# Patient Record
Sex: FEMALE | Race: BLACK OR AFRICAN AMERICAN | NOT HISPANIC OR LATINO
[De-identification: names, ages, dates, MRNs, and addresses within clinical notes are randomized per-mention and may not be internally consistent; named-entity substitution may affect disease eponyms.]

---

## 2019-04-21 ENCOUNTER — TRANSCRIPTION ENCOUNTER (OUTPATIENT)
Age: 34
End: 2019-04-21

## 2020-08-13 ENCOUNTER — APPOINTMENT (OUTPATIENT)
Dept: OBGYN | Facility: CLINIC | Age: 35
End: 2020-08-13
Payer: COMMERCIAL

## 2020-08-13 PROCEDURE — 99385 PREV VISIT NEW AGE 18-39: CPT

## 2020-08-13 PROCEDURE — 36415 COLL VENOUS BLD VENIPUNCTURE: CPT

## 2020-08-15 ENCOUNTER — TRANSCRIPTION ENCOUNTER (OUTPATIENT)
Age: 35
End: 2020-08-15

## 2020-11-10 PROBLEM — Z00.00 ENCOUNTER FOR PREVENTIVE HEALTH EXAMINATION: Status: ACTIVE | Noted: 2020-11-10

## 2020-11-12 ENCOUNTER — APPOINTMENT (OUTPATIENT)
Dept: SURGERY | Facility: CLINIC | Age: 35
End: 2020-11-12
Payer: COMMERCIAL

## 2020-11-12 VITALS
OXYGEN SATURATION: 100 % | HEIGHT: 65 IN | DIASTOLIC BLOOD PRESSURE: 87 MMHG | SYSTOLIC BLOOD PRESSURE: 143 MMHG | HEART RATE: 89 BPM | BODY MASS INDEX: 25.83 KG/M2 | WEIGHT: 155 LBS

## 2020-11-12 DIAGNOSIS — K43.9 VENTRAL HERNIA W/OUT OBSTRUCTION OR GANGRENE: ICD-10-CM

## 2020-11-12 DIAGNOSIS — Z82.49 FAMILY HISTORY OF ISCHEMIC HEART DISEASE AND OTHER DISEASES OF THE CIRCULATORY SYSTEM: ICD-10-CM

## 2020-11-12 DIAGNOSIS — Z78.9 OTHER SPECIFIED HEALTH STATUS: ICD-10-CM

## 2020-11-12 DIAGNOSIS — Z87.891 PERSONAL HISTORY OF NICOTINE DEPENDENCE: ICD-10-CM

## 2020-11-12 DIAGNOSIS — R19.00 INTRA-ABDOMINAL AND PELVIC SWELLING, MASS AND LUMP, UNSPECIFIED SITE: ICD-10-CM

## 2020-11-12 PROCEDURE — 99072 ADDL SUPL MATRL&STAF TM PHE: CPT

## 2020-11-12 PROCEDURE — 99203 OFFICE O/P NEW LOW 30 MIN: CPT

## 2020-11-12 RX ORDER — SACCHAROMYCES BOULARDII 250 MG
250 CAPSULE ORAL
Refills: 0 | Status: ACTIVE | COMMUNITY

## 2020-11-12 RX ORDER — MULTIVITAMIN
TABLET ORAL
Refills: 0 | Status: ACTIVE | COMMUNITY

## 2020-11-12 NOTE — CONSULT LETTER
[Dear  ___] : Dear  [unfilled], [Consult Letter:] : I had the pleasure of evaluating your patient, [unfilled]. [Consult Closing:] : Thank you very much for allowing me to participate in the care of this patient.  If you have any questions, please do not hesitate to contact me. [Sincerely,] : Sincerely, [FreeTextEntry3] : Barron Nicolas MD\par

## 2020-11-12 NOTE — HISTORY OF PRESENT ILLNESS
[de-identified] : NORM OCHOA is a 35 year old F who is referred to the office for consultation visit, she presents w the cc of having a bulge to the abdominal wall. She denies any pain or discomfort but wants to know if the bulge she feels is a hernia.

## 2020-11-12 NOTE — REVIEW OF SYSTEMS
[As Noted in HPI] : as noted in HPI [Negative] : Heme/Lymph [FreeTextEntry7] : feels a bulge to the abdomen; no pain or discomfort

## 2020-11-12 NOTE — PHYSICAL EXAM
[Normal Breath Sounds] : Normal breath sounds [Normal Rate and Rhythm] : normal rate and rhythm [No Rash or Lesion] : No rash or lesion [Alert] : alert [Oriented to Person] : oriented to person [Oriented to Place] : oriented to place [Oriented to Time] : oriented to time [Calm] : calm [JVD] : no jugular venous distention  [de-identified] : A/Ox3; NAD. appears comfortable [de-identified] : EOMI; sclera anicteric. Nasal mucosa pink, septum midline. Oral mucosa pink. Tongue midline, Pharynx without exudates. [de-identified] : diffuse bulge, left upper abdomen; \par abd is soft, NT/ND\par unable to appreciate defect /weakness  [de-identified] : +ROM, no joint swelling

## 2021-01-07 ENCOUNTER — TRANSCRIPTION ENCOUNTER (OUTPATIENT)
Age: 36
End: 2021-01-07

## 2021-01-18 ENCOUNTER — TRANSCRIPTION ENCOUNTER (OUTPATIENT)
Age: 36
End: 2021-01-18

## 2021-01-28 ENCOUNTER — APPOINTMENT (OUTPATIENT)
Dept: SURGERY | Facility: CLINIC | Age: 36
End: 2021-01-28
Payer: COMMERCIAL

## 2021-01-28 VITALS
WEIGHT: 165 LBS | HEIGHT: 65 IN | BODY MASS INDEX: 27.49 KG/M2 | HEART RATE: 78 BPM | DIASTOLIC BLOOD PRESSURE: 76 MMHG | SYSTOLIC BLOOD PRESSURE: 115 MMHG

## 2021-01-28 PROCEDURE — 99072 ADDL SUPL MATRL&STAF TM PHE: CPT

## 2021-01-28 PROCEDURE — 99213 OFFICE O/P EST LOW 20 MIN: CPT

## 2021-01-28 NOTE — PHYSICAL EXAM
[Normal Breath Sounds] : Normal breath sounds [Normal Rate and Rhythm] : normal rate and rhythm [No Rash or Lesion] : No rash or lesion [Alert] : alert [Oriented to Person] : oriented to person [Oriented to Place] : oriented to place [Oriented to Time] : oriented to time [Calm] : calm [JVD] : no jugular venous distention  [de-identified] : A/Ox3; NAD. appears comfortable [de-identified] : EOMI; sclera anicteric. Nasal mucosa pink, septum midline. Oral mucosa pink. Tongue midline, Pharynx without exudates. [de-identified] : small defect noted to umbilicus , no discomfort or tenderness \par abd is soft, NT/ND\par unable to appreciate defect /weakness  [de-identified] : +ROM, no joint swelling

## 2021-01-28 NOTE — HISTORY OF PRESENT ILLNESS
[de-identified] : NORM OCHOA is a 35 year old F here for follow up visit, c/o bulge to left side of abdominal wall. No pain. Patient had an Abd US 01/11/21, which had showed umbilical hernia containing fat and bowel. No abnormality seen to the left side of abdominal wall.

## 2021-01-28 NOTE — ASSESSMENT
[FreeTextEntry1] : Patient is a 35 y.o F who presents with bulge to left side of abdominal wall. Abd US from 01/11/21 reviewed. Small defect to umbilicus, which patient does not have any pain or discomfort.

## 2021-01-28 NOTE — PLAN
[FreeTextEntry1] : findings were discussed w the patient . Her concern was in the left upper quadrant where there is no finding\par She has a small umbilical hernia where she has absolutely no symptoms and is unrelated with her complaint in the LLQ abdomen\par would not recommend surgical intervention at this time\par monitor/observe for any changes or worsening of symptoms \par \par Warning signs, follow up, and restrictions were discussed with the patient.\par Patient's questions and concerns addressed to their satisfaction, and patient verbalized an understanding of the information discussed.\par

## 2021-01-28 NOTE — REVIEW OF SYSTEMS
[As Noted in HPI] : as noted in HPI [Negative] : Heme/Lymph [FreeTextEntry7] : c/o bulge, left side of abdominal wall

## 2021-09-19 ENCOUNTER — TRANSCRIPTION ENCOUNTER (OUTPATIENT)
Age: 36
End: 2021-09-19

## 2021-10-14 ENCOUNTER — EMERGENCY (EMERGENCY)
Facility: HOSPITAL | Age: 36
LOS: 0 days | Discharge: HOME | End: 2021-10-14
Attending: EMERGENCY MEDICINE
Payer: COMMERCIAL

## 2021-10-14 VITALS
RESPIRATION RATE: 17 BRPM | HEART RATE: 80 BPM | SYSTOLIC BLOOD PRESSURE: 130 MMHG | HEIGHT: 65 IN | OXYGEN SATURATION: 100 % | DIASTOLIC BLOOD PRESSURE: 90 MMHG | WEIGHT: 169.98 LBS | TEMPERATURE: 98 F

## 2021-10-14 DIAGNOSIS — R07.0 PAIN IN THROAT: ICD-10-CM

## 2021-10-14 DIAGNOSIS — J02.9 ACUTE PHARYNGITIS, UNSPECIFIED: ICD-10-CM

## 2021-10-14 PROCEDURE — 99283 EMERGENCY DEPT VISIT LOW MDM: CPT

## 2021-10-14 NOTE — ED ADULT TRIAGE NOTE - CCCP TRG CHIEF CMPLNT
Patient arrived as a direct admit to the floor. Patient transferred to bed from [facility].Patient resting in bed, call light and personal belongings within reach, bed in the low and locked position with upper side rails up. Assessment complete, vital signs stable, all needs met at this time. Hourly rounding in place, plan of care discussed with patient, patient verbalized understanding.    sore throat

## 2021-10-14 NOTE — ED PROVIDER NOTE - THROAT FINDINGS
R. tonsil looks very slightly larger, small white exudate. No uvular deviation, no tender lymphadenopathy, no voice change.

## 2021-10-14 NOTE — ED ADULT NURSE NOTE - ED STAT RN HANDOFF DETAILS
report taken from audi deng. pt c/o sore throat, denies: SOB, difficulty breathing, n/v, abd pain, fevers, cough. pt endorses she started her antibiotics today. pending MD [place].

## 2021-10-14 NOTE — ED PROVIDER NOTE - OBJECTIVE STATEMENT
Pt is a 35 yo lady with no significant past medical history who presents to the ED with sore throat. Has had 1 month of symptoms, and has recently seen ENT on Monday, who performed endoscopy, and said it was likely laryngitis, related to reflux, and started her on prilosec. She called him with peristent symptoms yesterday, and he started her on cefuroxime. No fevers, no cough, no abdominal pain, is vaccinated against covid, and has had negative test recently.

## 2021-10-14 NOTE — ED PROVIDER NOTE - PATIENT PORTAL LINK FT
You can access the FollowMyHealth Patient Portal offered by [facility] by registering at the following website: [email or web address]. By joining [facility]’s FollowMyHealth portal, you will also be able to view your health information using other applications (apps) compatible with our system.

## 2021-10-14 NOTE — ED PROVIDER NOTE - CLINICAL SUMMARY MEDICAL DECISION MAKING FREE TEXT BOX
Ddx: Pharyngitis/ unlikely peritonsillar abscess over 1 month, as well as recent ENT visit, may be starting to develop one, just started on antibiotics  Plan: Reassurance, and pt has f/u w her ENT in the next week.

## 2021-10-14 NOTE — ED ADULT NURSE NOTE - OBJECTIVE STATEMENT
36 year old female c/o right throat pain 10/10 for 1 mon now. Patient states pain is worse especially when eating and drinking. Patient was seen by ENT on Monday and was put on ABT (cefuroxime). Patient states B/L LE became painful and restless starting today.

## 2022-01-20 ENCOUNTER — APPOINTMENT (OUTPATIENT)
Dept: OBGYN | Facility: CLINIC | Age: 37
End: 2022-01-20
Payer: COMMERCIAL

## 2022-01-20 ENCOUNTER — NON-APPOINTMENT (OUTPATIENT)
Age: 37
End: 2022-01-20

## 2022-01-20 VITALS
WEIGHT: 183 LBS | DIASTOLIC BLOOD PRESSURE: 82 MMHG | HEIGHT: 65 IN | BODY MASS INDEX: 30.49 KG/M2 | SYSTOLIC BLOOD PRESSURE: 124 MMHG

## 2022-01-20 DIAGNOSIS — Z80.3 FAMILY HISTORY OF MALIGNANT NEOPLASM OF BREAST: ICD-10-CM

## 2022-01-20 DIAGNOSIS — Z31.9 ENCOUNTER FOR PROCREATIVE MANAGEMENT, UNSPECIFIED: ICD-10-CM

## 2022-01-20 DIAGNOSIS — Z83.49 FAMILY HISTORY OF OTHER ENDOCRINE, NUTRITIONAL AND METABOLIC DISEASES: ICD-10-CM

## 2022-01-20 PROCEDURE — 99395 PREV VISIT EST AGE 18-39: CPT

## 2022-01-20 PROCEDURE — 36415 COLL VENOUS BLD VENIPUNCTURE: CPT

## 2022-01-20 PROCEDURE — 99213 OFFICE O/P EST LOW 20 MIN: CPT | Mod: 25

## 2022-01-21 LAB
C TRACH RRNA SPEC QL NAA+PROBE: NOT DETECTED
DHEA-S SERPL-MCNC: 124 UG/DL
ESTRADIOL SERPL-MCNC: 170 PG/ML
FSH SERPL-MCNC: 3.4 IU/L
HPV HIGH+LOW RISK DNA PNL CVX: NOT DETECTED
N GONORRHOEA RRNA SPEC QL NAA+PROBE: NOT DETECTED
PROGEST SERPL-MCNC: 0.2 NG/ML
SOURCE AMPLIFICATION: NORMAL
TESTOST SERPL-MCNC: 7.9 NG/DL

## 2022-01-26 PROBLEM — Z83.49 FAMILY HISTORY OF THYROID DISEASE: Status: ACTIVE | Noted: 2022-01-26

## 2022-01-26 PROBLEM — Z31.9 PATIENT DESIRES PREGNANCY: Status: ACTIVE | Noted: 2022-01-26

## 2022-01-26 PROBLEM — Z80.3 FAMILY HISTORY OF MALIGNANT NEOPLASM OF BREAST: Status: ACTIVE | Noted: 2022-01-26

## 2022-01-26 LAB
ANTI-MUELLERIAN HORMONE: 5.45 NG/ML
CYTOLOGY CVX/VAG DOC THIN PREP: NORMAL

## 2022-01-26 NOTE — HISTORY OF PRESENT ILLNESS
[TextBox_19] : Breast MRI scheduled for Feb 1st [PGxTotal] : 1 [PGHxFullTerm] : 0 [PGHxPremature] : 0 [PGHxAbortions] : 0 [[facility] Services[facility]Living] : 0 [PGHxABInduced] : 0 [PGHxABSpont] : 0 [PGHxEctopic] : 0 [PGHxMultBirths] : 0 [FreeTextEntry1] : 2003: TOP with DVC

## 2022-01-26 NOTE — DISCUSSION/SUMMARY
[FreeTextEntry1] : 37 y/o P0 presenting for annual exam\par -f/u pap and GC/CT\par -Irregular menses, fertility eval: f/u hormonal panel\par -Breast MRI scheduled for 2/1 - requisitions given by PCP\par -Contraception: declines, desires pregnancy\par -f/u once labs result

## 2022-01-28 ENCOUNTER — APPOINTMENT (OUTPATIENT)
Dept: OBGYN | Facility: CLINIC | Age: 37
End: 2022-01-28

## 2022-01-28 ENCOUNTER — APPOINTMENT (OUTPATIENT)
Dept: OBGYN | Facility: CLINIC | Age: 37
End: 2022-01-28
Payer: COMMERCIAL

## 2022-01-28 DIAGNOSIS — N92.6 IRREGULAR MENSTRUATION, UNSPECIFIED: ICD-10-CM

## 2022-01-28 LAB
PROLACTIN SERPL-MCNC: 32.5 NG/ML
TSH SERPL-ACNC: 5.06 UIU/ML

## 2022-01-28 PROCEDURE — 99213 OFFICE O/P EST LOW 20 MIN: CPT | Mod: 95

## 2022-01-29 PROBLEM — N92.6 IRREGULAR MENSES: Status: ACTIVE | Noted: 2022-01-26

## 2022-01-29 NOTE — HISTORY OF PRESENT ILLNESS
[FreeTextEntry1] : 35 y/o F with irregular menses x3 months presenting for follow up. Fertility and AUB pan reviewed\par \par AMH: wnl \par TSH: elevated 5\par Prolactin with mild elevation 32

## 2022-01-29 NOTE — DISCUSSION/SUMMARY
[FreeTextEntry1] : 37 y/o F with irregular menses presenting for follow up\par -labs reviewed with patient as above\par -Fertility workup wnl\par -AUB panel significant for elevated TSH (FT4 not drawn), Prolactin very mildly elevated - referral given for Dr. Arnold endocrinologist for further evaluation and management - will monitor regulation of menses after intervention - if no endocrine intervention advised, consider ovasitol\par -f/u when she is actively trying to conceive if no pregnancy after 6 months

## 2022-04-04 NOTE — DATA REVIEWED
[FreeTextEntry1] : Date of Exam: 02-\par  \par EXAM: MRI BILATERAL BREASTS WITHOUT AND WITH CONTRAST\par \par HISTORY: The patient is 36 years old and is seen for high risk screening. Patient with no acute breast complaints at this time. Family history of breast cancer: Maternal aunt\par \par TECHNIQUE: Breast MR protocol - Multiplanar, multisequence MR imaging of both breasts was performed on a 3T magnet: T2 weighted sequence with fat suppression in the axial plane, T1 weighted sequences with fat suppression before and after the administration of gadolinium contrast in the axial plane, and sagittal reformats. The patient was scanned in the prone position utilizing a dedicated breast coil. Post processing subtraction was performed. This study was analyzed on a [employer] Workstation with capabilities of multiplanar reformatting, maximum intensity projection, computer aided detection and time:signal intensity kinetic curve generation.  Results were compared with the PACS workstation. \par Contrast: 16 mL gadoterate meglumine from a 20 mL vial.\par \par COMPARISON: The present examination has been compared to prior breast mammogram 8/19/2020 and breast ultrasound 11/13/2020 and 6/10/2021\par \par FINDINGS:  \par BACKGROUND BREAST APPEARANCE: There is extremely dense fibroglandular tissue with mild background parenchymal enhancement.\par The nipples and skin appear unremarkable bilaterally. \par \par Left breast:\par The superficial 1.3 x 0.9 cm heterogeneously enhancing well marginated mass in the anterior lower outer quadrant at 5:00 corresponds with the sonographic finding from 6/10/2021, and resembles a probably benign fibroadenoma. The 0.7 x 0.6 x 0.4 cm homogeneously enhancing well marginated mass with persistent type vascular enhancement kinetics and corresponding hyperintense STIR signal in the upper inner quadrant at 11:00 posterior depth (8 cm from the nipple) resembles a probably benign fibroadenoma or lymph node. Additional scattered nonspecific enhancing foci are seen, which are probably benign. No other suspicious masses or areas of normal enhancement are seen in the remainder left breast\par \par Right breast:\par The 0.4 x 0.4 x 0.3 cm well marginated homogeneously enhancing mass with persistent type vascular enhancement kinetics and a corresponding hyperintense STIR signal in the upper outer quadrant at 11:00 posterior depth (8 cm from the nipple), resembles a probably benign lymph node or small fibroadenoma. The 0.9 x 0.7 cm oval shaped heterogeneously enhancing mass with dark internal linear areas in the lower outer quadrant at 7:00 middle depth resembles a probably benign fibroadenoma. Additional scattered nonspecific enhancing foci are noted, are probably benign. No other suspicious masses or areas of normal enhancement are seen in the remainder right breast. \par \par There is no significant axillary or internal mammary lymphadenopathy.\par \par IMPRESSION: \par Left breast:\par -The superficial 1.3 x 0.9 cm well marginated mass at 5:00 corresponds with prior sonogram exam from 6/10/2021, and resembles a probably benign fibroadenoma. \par -The 0.7 x 0.6 x 0.4 cm homogeneously enhancing mass with at 11:00 posterior depth resembles a probably benign fibroadenoma or lymph node. \par \par Right breast:\par -The 0.4 x 0.4 x 0.3 cm well marginated homogeneously enhancing mass at 11:00 posterior depth, resembles a probably benign lymph node or small fibroadenoma\par -The 0.9 x 0.7 cm heterogeneously enhancing mass at 7:00 middle depth resembles a probably benign fibroadenoma. \par \par \par FOLLOW-UP: Follow-up imaging in 6 months.\par -COMPLETE BILATERAL BREAST ULTRASOUND EXAM SHOULD BE PERFORMED AT THIS TIME.\par -6 MONTH FOLLOW-UP BILATERAL BREAST MRI EXAMINATION IS RECOMMENDED. \par \par ASSESSMENT: BI-RADS Category 3:  Probably benign.

## 2022-04-07 ENCOUNTER — APPOINTMENT (OUTPATIENT)
Dept: SURGERY | Facility: CLINIC | Age: 37
End: 2022-04-07

## 2022-04-07 DIAGNOSIS — N64.4 MASTODYNIA: ICD-10-CM

## 2022-04-21 ENCOUNTER — APPOINTMENT (OUTPATIENT)
Dept: SURGERY | Facility: CLINIC | Age: 37
End: 2022-04-21
Payer: COMMERCIAL

## 2022-04-21 VITALS — TEMPERATURE: 92.8 F

## 2022-04-21 VITALS
BODY MASS INDEX: 31.49 KG/M2 | SYSTOLIC BLOOD PRESSURE: 120 MMHG | HEIGHT: 65 IN | DIASTOLIC BLOOD PRESSURE: 83 MMHG | WEIGHT: 189 LBS | HEART RATE: 73 BPM

## 2022-04-21 PROBLEM — N64.4 BREAST PAIN IN FEMALE: Status: ACTIVE | Noted: 2022-04-21

## 2022-04-21 PROCEDURE — 99213 OFFICE O/P EST LOW 20 MIN: CPT

## 2022-04-21 NOTE — PHYSICAL EXAM
[Normal Breath Sounds] : Normal breath sounds [Normal Rate and Rhythm] : normal rate and rhythm [No Rash or Lesion] : No rash or lesion [Alert] : alert [Oriented to Person] : oriented to person [Oriented to Place] : oriented to place [Oriented to Time] : oriented to time [Calm] : calm

## 2022-04-21 NOTE — ASSESSMENT
[FreeTextEntry1] : Ms. OCHOA  is presenting today for an evaluation. Patient has some tenderness to the left breast, which comes and goes. \par Results of her recent imaging and physical examination findings were discussed in detail. She was informed of significance of findings. \par

## 2022-04-21 NOTE — HISTORY OF PRESENT ILLNESS
[de-identified] : Ms. NORM OCHOA is a 36 year y.o F who presents to the office w the cc of having left breast pain. The pain comes and comes, which she describes as a stinging/burning sensation. Ms OCHOA also admits to experiencing neck pains. Patient states she had seen her PCP, Dr. Mack, and had a breast US done, and also recommended for breast MRI. MRI was done 02/17/22. \par She denies feeling any breast masses or lumps to either side, no nipple discharge. No personal history of Breast CA, family history of Breast CA includes, aunt (maternal).

## 2022-04-21 NOTE — DATA REVIEWED
[FreeTextEntry1] : Date of Exam: 02-\par  \par EXAM: MRI BILATERAL BREASTS WITHOUT AND WITH CONTRAST\par \par HISTORY: The patient is 36 years old and is seen for high risk screening. Patient with no acute breast complaints at this time. Family history of breast cancer: Maternal aunt\par \par TECHNIQUE: Breast MR protocol - Multiplanar, multisequence MR imaging of both breasts was performed on a 3T magnet: T2 weighted sequence with fat suppression in the axial plane, T1 weighted sequences with fat suppression before and after the administration of gadolinium contrast in the axial plane, and sagittal reformats. The patient was scanned in the prone position utilizing a dedicated breast coil. Post processing subtraction was performed. This study was analyzed on a [email or web address] Workstation with capabilities of multiplanar reformatting, maximum intensity projection, computer aided detection and time:signal intensity kinetic curve generation.  Results were compared with the PACS workstation. \par Contrast: 16 mL gadoterate meglumine from a 20 mL vial.\par \par COMPARISON: The present examination has been compared to prior breast mammogram 8/19/2020 and breast ultrasound 11/13/2020 and 6/10/2021\par \par FINDINGS:  \par BACKGROUND BREAST APPEARANCE: There is extremely dense fibroglandular tissue with mild background parenchymal enhancement.\par The nipples and skin appear unremarkable bilaterally. \par \par Left breast:\par The superficial 1.3 x 0.9 cm heterogeneously enhancing well marginated mass in the anterior lower outer quadrant at 5:00 corresponds with the sonographic finding from 6/10/2021, and resembles a probably benign fibroadenoma. The 0.7 x 0.6 x 0.4 cm homogeneously enhancing well marginated mass with persistent type vascular enhancement kinetics and corresponding hyperintense STIR signal in the upper inner quadrant at 11:00 posterior depth (8 cm from the nipple) resembles a probably benign fibroadenoma or lymph node. Additional scattered nonspecific enhancing foci are seen, which are probably benign. No other suspicious masses or areas of normal enhancement are seen in the remainder left breast\par \par Right breast:\par The 0.4 x 0.4 x 0.3 cm well marginated homogeneously enhancing mass with persistent type vascular enhancement kinetics and a corresponding hyperintense STIR signal in the upper outer quadrant at 11:00 posterior depth (8 cm from the nipple), resembles a probably benign lymph node or small fibroadenoma. The 0.9 x 0.7 cm oval shaped heterogeneously enhancing mass with dark internal linear areas in the lower outer quadrant at 7:00 middle depth resembles a probably benign fibroadenoma. Additional scattered nonspecific enhancing foci are noted, are probably benign. No other suspicious masses or areas of normal enhancement are seen in the remainder right breast. \par \par There is no significant axillary or internal mammary lymphadenopathy.\par \par IMPRESSION: \par Left breast:\par -The superficial 1.3 x 0.9 cm well marginated mass at 5:00 corresponds with prior sonogram exam from 6/10/2021, and resembles a probably benign fibroadenoma. \par -The 0.7 x 0.6 x 0.4 cm homogeneously enhancing mass with at 11:00 posterior depth resembles a probably benign fibroadenoma or lymph node. \par \par Right breast:\par -The 0.4 x 0.4 x 0.3 cm well marginated homogeneously enhancing mass at 11:00 posterior depth, resembles a probably benign lymph node or small fibroadenoma\par -The 0.9 x 0.7 cm heterogeneously enhancing mass at 7:00 middle depth resembles a probably benign fibroadenoma. \par \par \par FOLLOW-UP: Follow-up imaging in 6 months.\par -COMPLETE BILATERAL BREAST ULTRASOUND EXAM SHOULD BE PERFORMED AT THIS TIME.\par -6 MONTH FOLLOW-UP BILATERAL BREAST MRI EXAMINATION IS RECOMMENDED. \par \par ASSESSMENT: BI-RADS Category 3:  Probably benign. \par \par \par \par \par \par 	\par Patient: NORM OCHOA\par YOB: 1985\par Phone: [phone]\par MRN: [id number] Acc: [id number]\par Date of Exam: 11-\par  \par BREAST ULTRASOUND BILATERAL COMPLETE\par \par CLINICAL HISTORY: Screening \par 35 years old patient presents for bilateral breast ultrasound exam. \par Z12.39 Screening Ultrasound\par Z80.3 Family history of Breast Cancer \par \par \par COMPARISON: None\par \par RIGHT BREAST: \par Sonographic evaluation of the right breast was performed, including each of the four\par quadrants and the retroareolar region, in clockwise fashion. \par \par 4:00, 4 cm from the nipple, 7 x 3 mm hypoechoic nodule\par 5:00, 3 cm from the nipple, 7 x 3 mm hypoechoic nodule\par 6:00, 2 cm from the nipple, 9 x 3 mm hypoechoic nodule\par 8:00, 6 cm from the nipple, 15 x 5 mm hypoechoic nodule\par 9:00, 7 cm from the nipple, 9 x 3 mm hypoechoic nodule\par Retroareolar, 10 x 7 mm hypoechoic nodule\par \par LEFT BREAST: \par Sonographic evaluation of the left breast was performed, including each of the four\par quadrants and the retroareolar region, in clockwise fashion. \par \par 12:00, 2 cm from the nipple, 9 x 4 mm hypoechoic nodule\par 2:00, 6 cm from the nipple, 8 x 4 mm hypoechoic nodule\par 3:00, 2 cm from the nipple, 8 x 3 mm hypoechoic nodule\par 4:00, 3 cm from the nipple, 6 x 3 mm hypoechoic nodule\par 5:00, 1 cm from the nipple, 16 x 8 mm hypoechoic nodule\par 10:00, 3 cm from the nipple, 10 x 3 mm hypoechoic nodule\par 10:00, 3 cm from the nipple, 10 x 2 mm cyst\par Retroareolar, 7 x 3 mm hypoechoic nodule\par \par IMPRESSION:\par Numerous hypoechoic nodules throughout both breasts. Benign cyst noted in the left breast.\par D 24.1, D 24.2, N 60.02\par \par Recommendation: Short interval six-month follow-up bilateral breast ultrasound to reassess\par stability\par BI-RADS 3-Probably Benign Finding(s)

## 2022-04-21 NOTE — PLAN
[FreeTextEntry1] : All of the options, risks and benefits were explained. \par F/U imaging in 6 months , breast mammo and US ; \par Surgical Intervention /Biopsy not indicated at this time\par \par The importance of monthly self-breast exams was reinforced. Patient's questions and concerns addressed to patient's satisfaction.

## 2022-04-21 NOTE — PHYSICAL EXAM
[de-identified] : A/Ox3; NAD. appears comfortable [de-identified] : EOMI; sclera anicteric. Nasal mucosa pink, septum midline. Oral mucosa pink. Tongue midline, Pharynx without exudates. [de-identified] : no cervical lymphadenopathy [de-identified] : dense breast tissue; No chest deformity, No asymmetry, Normal contours,No nodules, No masses, No axillary adenopathy, No nipple discharge,No tenderness\par  [de-identified] : +ROM, no joint swelling

## 2022-04-21 NOTE — HISTORY OF PRESENT ILLNESS
[de-identified] : Ms. NORM OCHOA is a 36 year y.o F who presents to the office w the cc of having left breast pain. The pain comes and comes, which she describes as a stinging/burning sensation. Ms OCHOA also admits to experiencing neck pains. Patient states she had seen her PCP, Dr. Mack, and had a breast US done, and also recommended for breast MRI. MRI was done 02/17/22. \par She denies feeling any breast masses or lumps to either side, no nipple discharge. No personal history of Breast CA, family history of Breast CA includes, aunt (maternal).

## 2022-04-21 NOTE — PHYSICAL EXAM
[de-identified] : A/Ox3; NAD. appears comfortable [de-identified] : EOMI; sclera anicteric. Nasal mucosa pink, septum midline. Oral mucosa pink. Tongue midline, Pharynx without exudates. [de-identified] : no cervical lymphadenopathy [de-identified] : dense breast tissue; No chest deformity, No asymmetry, Normal contours,No nodules, No masses, No axillary adenopathy, No nipple discharge,No tenderness\par  [de-identified] : +ROM, no joint swelling

## 2022-04-21 NOTE — DATA REVIEWED
[FreeTextEntry1] : Date of Exam: 02-\par  \par EXAM: MRI BILATERAL BREASTS WITHOUT AND WITH CONTRAST\par \par HISTORY: The patient is 36 years old and is seen for high risk screening. Patient with no acute breast complaints at this time. Family history of breast cancer: Maternal aunt\par \par TECHNIQUE: Breast MR protocol - Multiplanar, multisequence MR imaging of both breasts was performed on a 3T magnet: T2 weighted sequence with fat suppression in the axial plane, T1 weighted sequences with fat suppression before and after the administration of gadolinium contrast in the axial plane, and sagittal reformats. The patient was scanned in the prone position utilizing a dedicated breast coil. Post processing subtraction was performed. This study was analyzed on a [employer] Workstation with capabilities of multiplanar reformatting, maximum intensity projection, computer aided detection and time:signal intensity kinetic curve generation.  Results were compared with the PACS workstation. \par Contrast: 16 mL gadoterate meglumine from a 20 mL vial.\par \par COMPARISON: The present examination has been compared to prior breast mammogram 8/19/2020 and breast ultrasound 11/13/2020 and 6/10/2021\par \par FINDINGS:  \par BACKGROUND BREAST APPEARANCE: There is extremely dense fibroglandular tissue with mild background parenchymal enhancement.\par The nipples and skin appear unremarkable bilaterally. \par \par Left breast:\par The superficial 1.3 x 0.9 cm heterogeneously enhancing well marginated mass in the anterior lower outer quadrant at 5:00 corresponds with the sonographic finding from 6/10/2021, and resembles a probably benign fibroadenoma. The 0.7 x 0.6 x 0.4 cm homogeneously enhancing well marginated mass with persistent type vascular enhancement kinetics and corresponding hyperintense STIR signal in the upper inner quadrant at 11:00 posterior depth (8 cm from the nipple) resembles a probably benign fibroadenoma or lymph node. Additional scattered nonspecific enhancing foci are seen, which are probably benign. No other suspicious masses or areas of normal enhancement are seen in the remainder left breast\par \par Right breast:\par The 0.4 x 0.4 x 0.3 cm well marginated homogeneously enhancing mass with persistent type vascular enhancement kinetics and a corresponding hyperintense STIR signal in the upper outer quadrant at 11:00 posterior depth (8 cm from the nipple), resembles a probably benign lymph node or small fibroadenoma. The 0.9 x 0.7 cm oval shaped heterogeneously enhancing mass with dark internal linear areas in the lower outer quadrant at 7:00 middle depth resembles a probably benign fibroadenoma. Additional scattered nonspecific enhancing foci are noted, are probably benign. No other suspicious masses or areas of normal enhancement are seen in the remainder right breast. \par \par There is no significant axillary or internal mammary lymphadenopathy.\par \par IMPRESSION: \par Left breast:\par -The superficial 1.3 x 0.9 cm well marginated mass at 5:00 corresponds with prior sonogram exam from 6/10/2021, and resembles a probably benign fibroadenoma. \par -The 0.7 x 0.6 x 0.4 cm homogeneously enhancing mass with at 11:00 posterior depth resembles a probably benign fibroadenoma or lymph node. \par \par Right breast:\par -The 0.4 x 0.4 x 0.3 cm well marginated homogeneously enhancing mass at 11:00 posterior depth, resembles a probably benign lymph node or small fibroadenoma\par -The 0.9 x 0.7 cm heterogeneously enhancing mass at 7:00 middle depth resembles a probably benign fibroadenoma. \par \par \par FOLLOW-UP: Follow-up imaging in 6 months.\par -COMPLETE BILATERAL BREAST ULTRASOUND EXAM SHOULD BE PERFORMED AT THIS TIME.\par -6 MONTH FOLLOW-UP BILATERAL BREAST MRI EXAMINATION IS RECOMMENDED. \par \par ASSESSMENT: BI-RADS Category 3:  Probably benign. \par \par \par \par \par \par 	\par Patient: NORM OCHOA\par YOB: 1985\par Phone: [phone]\par MRN: [id number] Acc: [id number]\par Date of Exam: 11-\par  \par BREAST ULTRASOUND BILATERAL COMPLETE\par \par CLINICAL HISTORY: Screening \par 35 years old patient presents for bilateral breast ultrasound exam. \par Z12.39 Screening Ultrasound\par Z80.3 Family history of Breast Cancer \par \par \par COMPARISON: None\par \par RIGHT BREAST: \par Sonographic evaluation of the right breast was performed, including each of the four\par quadrants and the retroareolar region, in clockwise fashion. \par \par 4:00, 4 cm from the nipple, 7 x 3 mm hypoechoic nodule\par 5:00, 3 cm from the nipple, 7 x 3 mm hypoechoic nodule\par 6:00, 2 cm from the nipple, 9 x 3 mm hypoechoic nodule\par 8:00, 6 cm from the nipple, 15 x 5 mm hypoechoic nodule\par 9:00, 7 cm from the nipple, 9 x 3 mm hypoechoic nodule\par Retroareolar, 10 x 7 mm hypoechoic nodule\par \par LEFT BREAST: \par Sonographic evaluation of the left breast was performed, including each of the four\par quadrants and the retroareolar region, in clockwise fashion. \par \par 12:00, 2 cm from the nipple, 9 x 4 mm hypoechoic nodule\par 2:00, 6 cm from the nipple, 8 x 4 mm hypoechoic nodule\par 3:00, 2 cm from the nipple, 8 x 3 mm hypoechoic nodule\par 4:00, 3 cm from the nipple, 6 x 3 mm hypoechoic nodule\par 5:00, 1 cm from the nipple, 16 x 8 mm hypoechoic nodule\par 10:00, 3 cm from the nipple, 10 x 3 mm hypoechoic nodule\par 10:00, 3 cm from the nipple, 10 x 2 mm cyst\par Retroareolar, 7 x 3 mm hypoechoic nodule\par \par IMPRESSION:\par Numerous hypoechoic nodules throughout both breasts. Benign cyst noted in the left breast.\par D 24.1, D 24.2, N 60.02\par \par Recommendation: Short interval six-month follow-up bilateral breast ultrasound to reassess\par stability\par BI-RADS 3-Probably Benign Finding(s)

## 2022-06-16 ENCOUNTER — APPOINTMENT (OUTPATIENT)
Dept: ENDOCRINOLOGY | Facility: CLINIC | Age: 37
End: 2022-06-16

## 2022-08-09 RX ORDER — METRONIDAZOLE 7.5 MG/G
0.75 GEL VAGINAL
Qty: 1 | Refills: 0 | Status: ACTIVE | COMMUNITY
Start: 2022-08-09 | End: 1900-01-01

## 2022-08-29 ENCOUNTER — APPOINTMENT (OUTPATIENT)
Dept: OBGYN | Facility: CLINIC | Age: 37
End: 2022-08-29

## 2022-08-29 VITALS
BODY MASS INDEX: 31.32 KG/M2 | DIASTOLIC BLOOD PRESSURE: 80 MMHG | WEIGHT: 188 LBS | HEIGHT: 65 IN | SYSTOLIC BLOOD PRESSURE: 122 MMHG

## 2022-08-29 DIAGNOSIS — N76.0 ACUTE VAGINITIS: ICD-10-CM

## 2022-08-29 PROCEDURE — 99214 OFFICE O/P EST MOD 30 MIN: CPT

## 2022-09-02 NOTE — HISTORY OF PRESENT ILLNESS
[FreeTextEntry1] : Patient is a 36 y/o F presenting with complaints of vaginal odor and discharge. She was previously on metrogel, which she took for 2-3 days; however, stopped with the onset of her menses. She is also complaining of painful periods for which she usually takes about 400mg ibuprofen. As per prior visits, patient is considering conception and declines starting contraception. \par

## 2022-09-02 NOTE — PHYSICAL EXAM
[Appropriately responsive] : appropriately responsive [Alert] : alert [No Acute Distress] : no acute distress [Soft] : soft [Non-tender] : non-tender [Non-distended] : non-distended [No HSM] : No HSM [No Lesions] : no lesions [No Mass] : no mass [Oriented x3] : oriented x3 [FreeTextEntry3] : supple [FreeTextEntry5] : Normal respiratory effort [Labia Majora] : normal [Labia Minora] : normal [Normal] : normal [Uterine Adnexae] : normal [FreeTextEntry4] : Normal physiologic discharge

## 2022-09-02 NOTE — PLAN
[FreeTextEntry1] : 38 y/o F presenting with vaginal discharge/odor s/p incomplete metrogel therapy\par -f/u vaginitis panel. Therapy to be described as indicated. Recommend waiting for results prior to restarting metrogel\par -Vulvovaginal hygiene reviewed\par -Dysmenorrhea - recommend around the clock ibuprofen 600mg PO q6hrs, starting 24 hours prior to onset of menses\par -f/u PRN\par \par

## 2022-12-21 DIAGNOSIS — B37.9 CANDIDIASIS, UNSPECIFIED: ICD-10-CM

## 2022-12-21 RX ORDER — FLUCONAZOLE 150 MG/1
150 TABLET ORAL
Qty: 3 | Refills: 0 | Status: ACTIVE | COMMUNITY
Start: 2022-12-21 | End: 1900-01-01

## 2023-08-07 ENCOUNTER — NON-APPOINTMENT (OUTPATIENT)
Age: 38
End: 2023-08-07

## 2023-11-16 ENCOUNTER — APPOINTMENT (OUTPATIENT)
Dept: OBGYN | Facility: CLINIC | Age: 38
End: 2023-11-16
Payer: COMMERCIAL

## 2023-11-16 ENCOUNTER — ASOB RESULT (OUTPATIENT)
Age: 38
End: 2023-11-16

## 2023-11-16 VITALS
BODY MASS INDEX: 30.82 KG/M2 | WEIGHT: 185 LBS | SYSTOLIC BLOOD PRESSURE: 130 MMHG | HEIGHT: 65 IN | DIASTOLIC BLOOD PRESSURE: 81 MMHG

## 2023-11-16 DIAGNOSIS — Z01.419 ENCOUNTER FOR GYNECOLOGICAL EXAMINATION (GENERAL) (ROUTINE) W/OUT ABNORMAL FINDINGS: ICD-10-CM

## 2023-11-16 PROCEDURE — 99396 PREV VISIT EST AGE 40-64: CPT

## 2023-11-16 PROCEDURE — 99213 OFFICE O/P EST LOW 20 MIN: CPT | Mod: 25

## 2023-11-16 PROCEDURE — 36415 COLL VENOUS BLD VENIPUNCTURE: CPT

## 2023-11-17 LAB
C TRACH RRNA SPEC QL NAA+PROBE: NOT DETECTED
HCG SERPL-MCNC: 5271 MIU/ML
HPV 16 E6+E7 MRNA CVX QL NAA+PROBE: NOT DETECTED
HPV HIGH+LOW RISK DNA PNL CVX: NOT DETECTED
HPV18+45 E6+E7 MRNA CVX QL NAA+PROBE: NOT DETECTED
N GONORRHOEA RRNA SPEC QL NAA+PROBE: NOT DETECTED
PROGEST SERPL-MCNC: 10 NG/ML
SOURCE AMPLIFICATION: NORMAL

## 2023-11-20 ENCOUNTER — APPOINTMENT (OUTPATIENT)
Dept: OBGYN | Facility: CLINIC | Age: 38
End: 2023-11-20
Payer: COMMERCIAL

## 2023-11-20 PROCEDURE — 36415 COLL VENOUS BLD VENIPUNCTURE: CPT

## 2023-11-21 LAB
CYTOLOGY CVX/VAG DOC THIN PREP: NORMAL
HCG SERPL-MCNC: ABNORMAL MIU/ML

## 2023-11-25 ENCOUNTER — EMERGENCY (EMERGENCY)
Facility: HOSPITAL | Age: 38
LOS: 1 days | Discharge: HOME | End: 2023-11-25
Admitting: EMERGENCY MEDICINE
Payer: COMMERCIAL

## 2023-11-25 VITALS
TEMPERATURE: 98 F | RESPIRATION RATE: 16 BRPM | OXYGEN SATURATION: 100 % | SYSTOLIC BLOOD PRESSURE: 133 MMHG | HEART RATE: 91 BPM | DIASTOLIC BLOOD PRESSURE: 75 MMHG

## 2023-11-25 VITALS
DIASTOLIC BLOOD PRESSURE: 89 MMHG | OXYGEN SATURATION: 100 % | SYSTOLIC BLOOD PRESSURE: 118 MMHG | RESPIRATION RATE: 16 BRPM | HEART RATE: 80 BPM

## 2023-11-25 LAB
ALBUMIN SERPL ELPH-MCNC: 3.4 G/DL — SIGNIFICANT CHANGE UP (ref 3.3–5)
ALBUMIN SERPL ELPH-MCNC: 3.4 G/DL — SIGNIFICANT CHANGE UP (ref 3.3–5)
ALBUMIN SERPL ELPH-MCNC: 4.1 G/DL — SIGNIFICANT CHANGE UP (ref 3.3–5)
ALBUMIN SERPL ELPH-MCNC: 4.1 G/DL — SIGNIFICANT CHANGE UP (ref 3.3–5)
ALP SERPL-CCNC: 40 U/L — SIGNIFICANT CHANGE UP (ref 40–120)
ALP SERPL-CCNC: 40 U/L — SIGNIFICANT CHANGE UP (ref 40–120)
ALP SERPL-CCNC: 48 U/L — SIGNIFICANT CHANGE UP (ref 40–120)
ALP SERPL-CCNC: 48 U/L — SIGNIFICANT CHANGE UP (ref 40–120)
ALT FLD-CCNC: 16 U/L — SIGNIFICANT CHANGE UP (ref 4–33)
ALT FLD-CCNC: 16 U/L — SIGNIFICANT CHANGE UP (ref 4–33)
ALT FLD-CCNC: 9 U/L — SIGNIFICANT CHANGE UP (ref 4–33)
ALT FLD-CCNC: 9 U/L — SIGNIFICANT CHANGE UP (ref 4–33)
ANION GAP SERPL CALC-SCNC: 10 MMOL/L — SIGNIFICANT CHANGE UP (ref 7–14)
ANION GAP SERPL CALC-SCNC: 10 MMOL/L — SIGNIFICANT CHANGE UP (ref 7–14)
ANION GAP SERPL CALC-SCNC: 11 MMOL/L — SIGNIFICANT CHANGE UP (ref 7–14)
ANION GAP SERPL CALC-SCNC: 11 MMOL/L — SIGNIFICANT CHANGE UP (ref 7–14)
APPEARANCE UR: ABNORMAL
APPEARANCE UR: ABNORMAL
AST SERPL-CCNC: 19 U/L — SIGNIFICANT CHANGE UP (ref 4–32)
AST SERPL-CCNC: 19 U/L — SIGNIFICANT CHANGE UP (ref 4–32)
AST SERPL-CCNC: 42 U/L — HIGH (ref 4–32)
AST SERPL-CCNC: 42 U/L — HIGH (ref 4–32)
BACTERIA # UR AUTO: NEGATIVE /HPF — SIGNIFICANT CHANGE UP
BACTERIA # UR AUTO: NEGATIVE /HPF — SIGNIFICANT CHANGE UP
BASOPHILS # BLD AUTO: 0.09 K/UL — SIGNIFICANT CHANGE UP (ref 0–0.2)
BASOPHILS # BLD AUTO: 0.09 K/UL — SIGNIFICANT CHANGE UP (ref 0–0.2)
BASOPHILS NFR BLD AUTO: 1 % — SIGNIFICANT CHANGE UP (ref 0–2)
BASOPHILS NFR BLD AUTO: 1 % — SIGNIFICANT CHANGE UP (ref 0–2)
BILIRUB SERPL-MCNC: 1.2 MG/DL — SIGNIFICANT CHANGE UP (ref 0.2–1.2)
BILIRUB SERPL-MCNC: 1.2 MG/DL — SIGNIFICANT CHANGE UP (ref 0.2–1.2)
BILIRUB SERPL-MCNC: 1.4 MG/DL — HIGH (ref 0.2–1.2)
BILIRUB SERPL-MCNC: 1.4 MG/DL — HIGH (ref 0.2–1.2)
BILIRUB UR-MCNC: NEGATIVE — SIGNIFICANT CHANGE UP
BILIRUB UR-MCNC: NEGATIVE — SIGNIFICANT CHANGE UP
BLD GP AB SCN SERPL QL: NEGATIVE — SIGNIFICANT CHANGE UP
BLD GP AB SCN SERPL QL: NEGATIVE — SIGNIFICANT CHANGE UP
BUN SERPL-MCNC: 10 MG/DL — SIGNIFICANT CHANGE UP (ref 7–23)
BUN SERPL-MCNC: 10 MG/DL — SIGNIFICANT CHANGE UP (ref 7–23)
BUN SERPL-MCNC: 12 MG/DL — SIGNIFICANT CHANGE UP (ref 7–23)
BUN SERPL-MCNC: 12 MG/DL — SIGNIFICANT CHANGE UP (ref 7–23)
CALCIUM SERPL-MCNC: 7.8 MG/DL — LOW (ref 8.4–10.5)
CALCIUM SERPL-MCNC: 7.8 MG/DL — LOW (ref 8.4–10.5)
CALCIUM SERPL-MCNC: 9.6 MG/DL — SIGNIFICANT CHANGE UP (ref 8.4–10.5)
CALCIUM SERPL-MCNC: 9.6 MG/DL — SIGNIFICANT CHANGE UP (ref 8.4–10.5)
CAST: 0 /LPF — SIGNIFICANT CHANGE UP (ref 0–4)
CAST: 0 /LPF — SIGNIFICANT CHANGE UP (ref 0–4)
CHLORIDE SERPL-SCNC: 100 MMOL/L — SIGNIFICANT CHANGE UP (ref 98–107)
CHLORIDE SERPL-SCNC: 100 MMOL/L — SIGNIFICANT CHANGE UP (ref 98–107)
CHLORIDE SERPL-SCNC: 108 MMOL/L — HIGH (ref 98–107)
CHLORIDE SERPL-SCNC: 108 MMOL/L — HIGH (ref 98–107)
CO2 SERPL-SCNC: 21 MMOL/L — LOW (ref 22–31)
CO2 SERPL-SCNC: 21 MMOL/L — LOW (ref 22–31)
CO2 SERPL-SCNC: 22 MMOL/L — SIGNIFICANT CHANGE UP (ref 22–31)
CO2 SERPL-SCNC: 22 MMOL/L — SIGNIFICANT CHANGE UP (ref 22–31)
COLOR SPEC: YELLOW — SIGNIFICANT CHANGE UP
COLOR SPEC: YELLOW — SIGNIFICANT CHANGE UP
CREAT SERPL-MCNC: 0.67 MG/DL — SIGNIFICANT CHANGE UP (ref 0.5–1.3)
CREAT SERPL-MCNC: 0.67 MG/DL — SIGNIFICANT CHANGE UP (ref 0.5–1.3)
CREAT SERPL-MCNC: 0.81 MG/DL — SIGNIFICANT CHANGE UP (ref 0.5–1.3)
CREAT SERPL-MCNC: 0.81 MG/DL — SIGNIFICANT CHANGE UP (ref 0.5–1.3)
DIFF PNL FLD: ABNORMAL
DIFF PNL FLD: ABNORMAL
EGFR: 113 ML/MIN/1.73M2 — SIGNIFICANT CHANGE UP
EGFR: 113 ML/MIN/1.73M2 — SIGNIFICANT CHANGE UP
EGFR: 94 ML/MIN/1.73M2 — SIGNIFICANT CHANGE UP
EGFR: 94 ML/MIN/1.73M2 — SIGNIFICANT CHANGE UP
EOSINOPHIL # BLD AUTO: 0.1 K/UL — SIGNIFICANT CHANGE UP (ref 0–0.5)
EOSINOPHIL # BLD AUTO: 0.1 K/UL — SIGNIFICANT CHANGE UP (ref 0–0.5)
EOSINOPHIL NFR BLD AUTO: 1.1 % — SIGNIFICANT CHANGE UP (ref 0–6)
EOSINOPHIL NFR BLD AUTO: 1.1 % — SIGNIFICANT CHANGE UP (ref 0–6)
EPI CELLS # UR: PRESENT
EPI CELLS # UR: PRESENT
GLUCOSE SERPL-MCNC: 71 MG/DL — SIGNIFICANT CHANGE UP (ref 70–99)
GLUCOSE SERPL-MCNC: 71 MG/DL — SIGNIFICANT CHANGE UP (ref 70–99)
GLUCOSE SERPL-MCNC: 90 MG/DL — SIGNIFICANT CHANGE UP (ref 70–99)
GLUCOSE SERPL-MCNC: 90 MG/DL — SIGNIFICANT CHANGE UP (ref 70–99)
GLUCOSE UR QL: NEGATIVE MG/DL — SIGNIFICANT CHANGE UP
GLUCOSE UR QL: NEGATIVE MG/DL — SIGNIFICANT CHANGE UP
HCG SERPL-ACNC: SIGNIFICANT CHANGE UP MIU/ML
HCG SERPL-ACNC: SIGNIFICANT CHANGE UP MIU/ML
HCT VFR BLD CALC: 34.7 % — SIGNIFICANT CHANGE UP (ref 34.5–45)
HCT VFR BLD CALC: 34.7 % — SIGNIFICANT CHANGE UP (ref 34.5–45)
HGB BLD-MCNC: 12.6 G/DL — SIGNIFICANT CHANGE UP (ref 11.5–15.5)
HGB BLD-MCNC: 12.6 G/DL — SIGNIFICANT CHANGE UP (ref 11.5–15.5)
IANC: 5.24 K/UL — SIGNIFICANT CHANGE UP (ref 1.8–7.4)
IANC: 5.24 K/UL — SIGNIFICANT CHANGE UP (ref 1.8–7.4)
IMM GRANULOCYTES NFR BLD AUTO: 0.1 % — SIGNIFICANT CHANGE UP (ref 0–0.9)
IMM GRANULOCYTES NFR BLD AUTO: 0.1 % — SIGNIFICANT CHANGE UP (ref 0–0.9)
KETONES UR-MCNC: NEGATIVE MG/DL — SIGNIFICANT CHANGE UP
KETONES UR-MCNC: NEGATIVE MG/DL — SIGNIFICANT CHANGE UP
LEUKOCYTE ESTERASE UR-ACNC: NEGATIVE — SIGNIFICANT CHANGE UP
LEUKOCYTE ESTERASE UR-ACNC: NEGATIVE — SIGNIFICANT CHANGE UP
LYMPHOCYTES # BLD AUTO: 2.74 K/UL — SIGNIFICANT CHANGE UP (ref 1–3.3)
LYMPHOCYTES # BLD AUTO: 2.74 K/UL — SIGNIFICANT CHANGE UP (ref 1–3.3)
LYMPHOCYTES # BLD AUTO: 31 % — SIGNIFICANT CHANGE UP (ref 13–44)
LYMPHOCYTES # BLD AUTO: 31 % — SIGNIFICANT CHANGE UP (ref 13–44)
MCHC RBC-ENTMCNC: 29.2 PG — SIGNIFICANT CHANGE UP (ref 27–34)
MCHC RBC-ENTMCNC: 29.2 PG — SIGNIFICANT CHANGE UP (ref 27–34)
MCHC RBC-ENTMCNC: 36.3 GM/DL — HIGH (ref 32–36)
MCHC RBC-ENTMCNC: 36.3 GM/DL — HIGH (ref 32–36)
MCV RBC AUTO: 80.5 FL — SIGNIFICANT CHANGE UP (ref 80–100)
MCV RBC AUTO: 80.5 FL — SIGNIFICANT CHANGE UP (ref 80–100)
MONOCYTES # BLD AUTO: 0.67 K/UL — SIGNIFICANT CHANGE UP (ref 0–0.9)
MONOCYTES # BLD AUTO: 0.67 K/UL — SIGNIFICANT CHANGE UP (ref 0–0.9)
MONOCYTES NFR BLD AUTO: 7.6 % — SIGNIFICANT CHANGE UP (ref 2–14)
MONOCYTES NFR BLD AUTO: 7.6 % — SIGNIFICANT CHANGE UP (ref 2–14)
NEUTROPHILS # BLD AUTO: 5.24 K/UL — SIGNIFICANT CHANGE UP (ref 1.8–7.4)
NEUTROPHILS # BLD AUTO: 5.24 K/UL — SIGNIFICANT CHANGE UP (ref 1.8–7.4)
NEUTROPHILS NFR BLD AUTO: 59.2 % — SIGNIFICANT CHANGE UP (ref 43–77)
NEUTROPHILS NFR BLD AUTO: 59.2 % — SIGNIFICANT CHANGE UP (ref 43–77)
NITRITE UR-MCNC: NEGATIVE — SIGNIFICANT CHANGE UP
NITRITE UR-MCNC: NEGATIVE — SIGNIFICANT CHANGE UP
NRBC # BLD: 0 /100 WBCS — SIGNIFICANT CHANGE UP (ref 0–0)
NRBC # BLD: 0 /100 WBCS — SIGNIFICANT CHANGE UP (ref 0–0)
NRBC # FLD: 0 K/UL — SIGNIFICANT CHANGE UP (ref 0–0)
NRBC # FLD: 0 K/UL — SIGNIFICANT CHANGE UP (ref 0–0)
PH UR: 5.5 — SIGNIFICANT CHANGE UP (ref 5–8)
PH UR: 5.5 — SIGNIFICANT CHANGE UP (ref 5–8)
PLATELET # BLD AUTO: 256 K/UL — SIGNIFICANT CHANGE UP (ref 150–400)
PLATELET # BLD AUTO: 256 K/UL — SIGNIFICANT CHANGE UP (ref 150–400)
POTASSIUM SERPL-MCNC: 3.5 MMOL/L — SIGNIFICANT CHANGE UP (ref 3.5–5.3)
POTASSIUM SERPL-MCNC: 3.5 MMOL/L — SIGNIFICANT CHANGE UP (ref 3.5–5.3)
POTASSIUM SERPL-MCNC: 4.9 MMOL/L — SIGNIFICANT CHANGE UP (ref 3.5–5.3)
POTASSIUM SERPL-MCNC: 4.9 MMOL/L — SIGNIFICANT CHANGE UP (ref 3.5–5.3)
POTASSIUM SERPL-SCNC: 3.5 MMOL/L — SIGNIFICANT CHANGE UP (ref 3.5–5.3)
POTASSIUM SERPL-SCNC: 3.5 MMOL/L — SIGNIFICANT CHANGE UP (ref 3.5–5.3)
POTASSIUM SERPL-SCNC: 4.9 MMOL/L — SIGNIFICANT CHANGE UP (ref 3.5–5.3)
POTASSIUM SERPL-SCNC: 4.9 MMOL/L — SIGNIFICANT CHANGE UP (ref 3.5–5.3)
PROT SERPL-MCNC: 6.1 G/DL — SIGNIFICANT CHANGE UP (ref 6–8.3)
PROT SERPL-MCNC: 6.1 G/DL — SIGNIFICANT CHANGE UP (ref 6–8.3)
PROT SERPL-MCNC: 7.6 G/DL — SIGNIFICANT CHANGE UP (ref 6–8.3)
PROT SERPL-MCNC: 7.6 G/DL — SIGNIFICANT CHANGE UP (ref 6–8.3)
PROT UR-MCNC: SIGNIFICANT CHANGE UP MG/DL
PROT UR-MCNC: SIGNIFICANT CHANGE UP MG/DL
RBC # BLD: 4.31 M/UL — SIGNIFICANT CHANGE UP (ref 3.8–5.2)
RBC # BLD: 4.31 M/UL — SIGNIFICANT CHANGE UP (ref 3.8–5.2)
RBC # FLD: 12.2 % — SIGNIFICANT CHANGE UP (ref 10.3–14.5)
RBC # FLD: 12.2 % — SIGNIFICANT CHANGE UP (ref 10.3–14.5)
RBC CASTS # UR COMP ASSIST: 405 /HPF — HIGH (ref 0–4)
RBC CASTS # UR COMP ASSIST: 405 /HPF — HIGH (ref 0–4)
RH IG SCN BLD-IMP: POSITIVE — SIGNIFICANT CHANGE UP
RH IG SCN BLD-IMP: POSITIVE — SIGNIFICANT CHANGE UP
SODIUM SERPL-SCNC: 133 MMOL/L — LOW (ref 135–145)
SODIUM SERPL-SCNC: 133 MMOL/L — LOW (ref 135–145)
SODIUM SERPL-SCNC: 139 MMOL/L — SIGNIFICANT CHANGE UP (ref 135–145)
SODIUM SERPL-SCNC: 139 MMOL/L — SIGNIFICANT CHANGE UP (ref 135–145)
SP GR SPEC: 1.02 — SIGNIFICANT CHANGE UP (ref 1–1.03)
SP GR SPEC: 1.02 — SIGNIFICANT CHANGE UP (ref 1–1.03)
SQUAMOUS # UR AUTO: 2 /HPF — SIGNIFICANT CHANGE UP (ref 0–5)
SQUAMOUS # UR AUTO: 2 /HPF — SIGNIFICANT CHANGE UP (ref 0–5)
UROBILINOGEN FLD QL: 0.2 MG/DL — SIGNIFICANT CHANGE UP (ref 0.2–1)
UROBILINOGEN FLD QL: 0.2 MG/DL — SIGNIFICANT CHANGE UP (ref 0.2–1)
WBC # BLD: 8.85 K/UL — SIGNIFICANT CHANGE UP (ref 3.8–10.5)
WBC # BLD: 8.85 K/UL — SIGNIFICANT CHANGE UP (ref 3.8–10.5)
WBC # FLD AUTO: 8.85 K/UL — SIGNIFICANT CHANGE UP (ref 3.8–10.5)
WBC # FLD AUTO: 8.85 K/UL — SIGNIFICANT CHANGE UP (ref 3.8–10.5)
WBC UR QL: 1 /HPF — SIGNIFICANT CHANGE UP (ref 0–5)
WBC UR QL: 1 /HPF — SIGNIFICANT CHANGE UP (ref 0–5)

## 2023-11-25 PROCEDURE — 76817 TRANSVAGINAL US OBSTETRIC: CPT | Mod: 26

## 2023-11-25 PROCEDURE — 99285 EMERGENCY DEPT VISIT HI MDM: CPT

## 2023-11-25 NOTE — ED PROVIDER NOTE - NOTES
lmp oct 12, started prenatal care, vaginal spotting since nov 13, worsening vaginal bleed since yesterday and today with clots, TVUS no fetal pole no yolk sac
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
## 2023-11-25 NOTE — ED PROVIDER NOTE - PROGRESS NOTE DETAILS
TARA Wilburn Addendum-----This patient was signed out to me by DWIGHT Roth; pt was pending Ob/GYN eval at time of sign-out; repeat CMP was sent as initial was moderately hemolyzed.  In the interim, pt objectively noted to be resting comfortably; pt has been clinically stable.  Ob/GYN evaluated pt and I discussed their recs with them; per Ob/GYN, pt may be discharged home with close f/u with pt's OB on Monday; return precautions discussed.  Pt noted to be emotional and crying in RW; pt stating she wants to go home and doesn't want to wait for CMP results.  Both nursing as well as this writer discussed at length current DDx with pt and importance of close f/u to reassess status of pregnancy.  Strict return precautions and pelvic rest discussed.  Pt verbalized understanding to all as discussed.  Pt being dc'd home per below  instructions. TARA Wilburn Addendum-----This patient was signed out to me by DWIGHT Roth; pt was pending Ob/GYN eval at time of sign-out; repeat CMP was sent as initial was moderately hemolyzed.  In the interim, pt objectively noted to be resting comfortably; pt has been clinically stable.  Ob/GYN evaluated pt and I discussed their recs with them; per Ob/GYN, pt may be discharged home with close f/u with pt's OB on Monday; return precautions discussed.  Pt noted to be emotional and crying by RNs in RW; pt stating she wants to go home and doesn't want to wait for CMP results.  Both nursing as well as this writer discussed at length current DDx with pt and importance of close f/u to reassess status of pregnancy.  Strict return precautions and pelvic rest was discussed with the patient by this writer.  Pt verbalized understanding to all as discussed.  Pt being dc'd home per below  instructions. TARA Wilburn Addendum----Repeat CMP resulted; mildly hemolyzed specimen.  Potassium 3.5, Tbil 1.4.  I called pt at # provided by patient at discharge: [phone] ---> Pt informed about CMP results.  Pt advised to eat potassium-rich foods and have her OB/GYN repeat CMP on Monday when HCG is repeated.  Pt informed about [facility] Patient Portal so she can access these results.  Pt verbalized understanding to all as discussed.

## 2023-11-25 NOTE — ED ADULT NURSE NOTE - OBJECTIVE STATEMENT
Patient received to intake room 4 A&Ox4, ambulatory, accompanied by  G2 coming to the ED for complaints of vaginal bleeding that started yesterday. Patient states that she did lab work to check hcg levels Monday and were noted to be elevated. Saturating approximately 4 pads a day.  Patient denies abdominal pain, N/V/D, fevers, chills, chest pain, sob at this time. RR equal and unlabored. 20G IV placed in the right hand. Labs drawn and sent. Care plan continued. Comfort measures provided. Safety maintained. Awaiting lab results and imaging.

## 2023-11-25 NOTE — ED PROVIDER NOTE - CARE PLAN
1 Principal Discharge DX:	Vaginal bleeding in pregnancy  Secondary Diagnosis:	Threatened miscarriage

## 2023-11-25 NOTE — ED ADULT TRIAGE NOTE - CHIEF COMPLAINT QUOTE
pt  states I m 6 weeks and im bleeding vaginally/  sent in for eval/ [pt passed large clot this morning

## 2023-11-25 NOTE — CONSULT NOTE ADULT - SUBJECTIVE AND OBJECTIVE BOX
GYN Consult Note    INCOMPLETE NOTE    38y  at 6w2d by LMP (10/12) presents with vaginal bleeding.  Reports she has had spotting for a few weeks,and went to her OBGYN for confirmation of pregnancy visit on .  TVUS at that time showed GS without definitive IUP.  Reports her bleeding was heavier overnight/today.  States she soaked a pantyliner last night and today soaked one pad with additional passage of tona sized clot.      OB/GYN HISTORY:   Physician:   Ob:   Gyn: Denies fibroids, cysts, PCOS, endometriosis, or history of genital lesions   PMH: Denies    PSH: Denies   Meds:    Allergies:         REVIEW OF SYSTEMS  General: denies fevers, chills, tiredness  Skin/Breast: denies breast pain  Respiratory and Thorax: denies shortness of breath, denies cough  Cardiovascular: denies chest pain and denies palpitations  Gastrointestinal: denies abdominal pain, nausea/ vomiting	  Genitourinary: denies dysuria, increased urinary frequency, urgency	  Constitutional, Cardiovascular, Respiratory, Gastrointestinal, Genitourinary, Musculoskeletal and Integumentary review of systems are normal except as noted. 	      Vital Signs Last 24 Hrs  T(C): 37.2 (2023 19:29), Max: 37.2 (2023 19:29)  T(F): 98.9 (2023 19:29), Max: 98.9 (2023 19:29)  HR: 75 (2023 19:29) (75 - 91)  BP: 116/81 (2023 19:29) (116/81 - 133/75)  BP(mean): --  RR: 16 (2023 19:29) (16 - 16)  SpO2: 100% (2023 19:29) (100% - 100%)    Parameters below as of 2023 19:29  Patient On (Oxygen Delivery Method): room air      PHYSICAL EXAM:   Gen: NAD, alert and oriented x 3  Cardiovascular: RRR  Respiratory: breathing comfortably on RA  Abd: soft, non tender, non-distended  Pelvic: closed/long, no CMT, Uterus: normal size, non tender  Adnexa: non tender, no palpable masses  Speculum Exam: No active bleeding from os.  Physiologic discharge.    Extremities: non-tender b/l, no edema   Skin: warm and well perfused  *Pelvic exam performed with chaperone present    LABS:                        12.6   8.85  )-----------( 256      ( 2023 16:15 )             34.7         133<L>  |  100  |  12  ----------------------------<  90  4.9   |  22  |  0.81    Ca    9.6      2023 16:15    TPro  7.6  /  Alb  4.1  /  TBili  1.2  /  DBili  x   /  AST  42<H>  /  ALT  16  /  AlkPhos  48        Urinalysis Basic - ( 2023 16:15 )    Color: Yellow / Appearance: Cloudy / S.017 / pH: x  Gluc: 90 mg/dL / Ketone: Negative mg/dL  / Bili: Negative / Urobili: 0.2 mg/dL   Blood: x / Protein: Trace mg/dL / Nitrite: Negative   Leuk Esterase: Negative / RBC: 405 /HPF / WBC 1 /HPF   Sq Epi: x / Non Sq Epi: 2 /HPF / Bacteria: Negative /HPF        RADIOLOGY & ADDITIONAL STUDIES: GYN Consult Note    38y  at 6w2d by LMP (10/12) presents with vaginal bleeding.  Reports she has had spotting for a few weeks,and went to her OBGYN for confirmation of pregnancy visit on .  TVUS at that time showed 8.35mm GS without definitive IUP.  Reports her bleeding was heavier overnight/today.  States she soaked a pantyliner last night and today soaked one pad with additional passage of tona sized clot.  States bleeding has been lighter since that time.  Denies abdominal cramping, lightheadedness, dizziness, chest pain, shortness of breat, fevers, chills, nausea, vomiting.     OB/GYN HISTORY:   Physician: Dr. Mondragon  Ob: TOP w/d&c  Gyn: +Fibroids (reports never had intervention for fibroids), +hx abnormal pap s/p colpo wnl - reports repeat paps all wnl; Denies cysts, STIs   PMH: Denies    PSH: Denies   Meds: Claritin, PNV, Vit D, Ca  Allergies: Aleve (hives)      Vital Signs Last 24 Hrs  T(C): 37.2 (2023 19:29), Max: 37.2 (2023 19:29)  T(F): 98.9 (2023 19:29), Max: 98.9 (2023 19:29)  HR: 75 (2023 19:29) (75 - 91)  BP: 116/81 (2023 19:29) (116/81 - 133/75)  BP(mean): --  RR: 16 (2023 19:29) (16 - 16)  SpO2: 100% (2023 19:29) (100% - 100%)    Parameters below as of 2023 19:29  Patient On (Oxygen Delivery Method): room air      PHYSICAL EXAM:   Gen: NAD, alert and oriented x 3  Cardiovascular: RRR  Respiratory: breathing comfortably on RA  Abd: soft, non tender, non-distended  Pelvic: 0.5cm dilated/long, no CMT, Uterus: 6wk size, non tender  Adnexa: non tender, no palpable masses  Speculum Exam: Slow bleeding from os like menstrual period, no heavy bleeding.   Extremities: non-tender b/l, no edema   Skin: warm and well perfused  *Pelvic exam performed with chaperone present: LEATHA [place]    LABS:                        12.6   8.85  )-----------( 256      ( 2023 16:15 )             34.7         133<L>  |  100  |  12  ----------------------------<  90  4.9   |  22  |  0.81    Ca    9.6      2023 16:15    TPro  7.6  /  Alb  4.1  /  TBili  1.2  /  DBili  x   /  AST  42<H>  /  ALT  16  /  AlkPhos  48        Urinalysis Basic - ( 2023 16:15 )    Color: Yellow / Appearance: Cloudy / S.017 / pH: x  Gluc: 90 mg/dL / Ketone: Negative mg/dL  / Bili: Negative / Urobili: 0.2 mg/dL   Blood: x / Protein: Trace mg/dL / Nitrite: Negative   Leuk Esterase: Negative / RBC: 405 /HPF / WBC 1 /HPF   Sq Epi: x / Non Sq Epi: 2 /HPF / Bacteria: Negative /HPF    HC        RADIOLOGY & ADDITIONAL STUDIES:    ACC: [id number] EXAM:  US OB TRANSVAGINAL   ORDERED BY: JESSY OSORIO     PROCEDURE DATE:  2023          INTERPRETATION:  CLINICAL INFORMATION: Positive beta-hCG 15,917. First   trimester pregnancy. Vaginal bleeding.    LMP: 10/12/2023    Estimated Gestational Age by LMP: 6 weeks and 2 days    COMPARISON: None available.    Endovaginal and transabdominal pelvic sonogram.    FINDINGS:  Uterus: The uterus is enlarged with multiple uterine myomas including 6.8   x 6.0 cm right intramural myoma. There is intrauterine gestational sac   without visualization of fetal pole or yolk sac. There is adjacent 4 x 3   cm heterogeneous mass in the endometrial canal which may represent   intracavitary myoma or perigestational hematoma.    Gestational Sac Size (mean): 13.4 mm  Gestational Sac Shape : Abnormal. The sac is oblong in the shape.  Crown Rump Length: Not visualized.  Estimated Gestational Age: 6 weeks and 1 day.  Yolk Sac: Not visualized.    Right ovary: 3.7 cm x 1.8 cm x 2.3 cm. Within normal limits. There is 1.5   cm right ovarian corpus luteum cyst.  Left ovary: 3.5 cm x 1.6 cm x 1.8 cm. Within normal limits.    Fluid: Trace amount of free fluid in the pelvis.    IMPRESSION:  Single intrauterine gestational sac without visualization of fetal pole   or yolk sac. Recommend follow-up ultrasound in 2 weeks.    Multiple uterine myomas. A 4 x 3 cm heterogeneous mass in the endometrial   canal which may represent intracavitary myoma or perigestational hematoma.    Estimated gestational age of 6 weeks and 1  Estimated due date of  2024        --- End of Report ---            JOHN DAMICO MD; Attending Radiologist  This document has been electronically signed. 2023  6:24PM

## 2023-11-25 NOTE — ED ADULT NURSE REASSESSMENT NOTE - NS ED NURSE REASSESS COMMENT FT1
Patient received in RW. Awake. Respirations even and unlabored. No signs of acute distress noted. Updated on plan of care. Comfort and safety maintained. All current care needs met. Care plan continued Mago DUQUE

## 2023-11-25 NOTE — CONSULT NOTE ADULT - ASSESSMENT
INCOMPLETE NOTE Patient is a 38y  at 6w2d by LMP (10/12) presents with vaginal bleeding and passage of 1 clot. VSS.  H/H wnl.  Pelvic exam with slow bleeding from os without heavy bleeding and no tenderness.  TVUS showing 13.4mm GS without YS or FP and no adnexal masses.  Differential for pregnancy of unknown location includes early IUP, early pregnancy loss, and ectopic pregnancy.  Findings likely favor nonviable pregnancy vs early IUP, although ectopic pregnancy cannot definitively be ruled out at this time.    Recommendations:  - Patient to return in 48 hours (Monday, ) to primary OBGYN office for repeat bHCG and ultrasound  - Patient counseled on likely findings of nonviable pregnancy given bloodwork and ultrasound findings at this time, although patient aware that unable to make definitive diagnosis at this time.  Patient expressed understanding and aware of need for continued follow up.  - Rh type: O+    (No need for rhogam at this time.)  - Patient counseled on strict return precautions including heavy vaginal bleeding >1 pad/hour, severe abdominal pain refractory to pain medications, inability to tolerate PO with nausea/vomiting, chest pain, shortness of breath, lightheadedness, dizziness, fevers, or chills.  Patient expressed understanding   - Recommend repeat LFTs given elevated AST, although sample noted to be hemolyzed  - Patient stable for d/c home from GYN perspective.  Primary management per ED team.      d/w Dr. Lindsey Lakhani PGY2

## 2023-11-25 NOTE — ED PROVIDER NOTE - NSFOLLOWUPINSTRUCTIONS_ED_ALL_ED_FT
Follow up with your OB/GYN doctor in 48 hours (Monday, 11/27) for repeat bHCG and ultrasound  **Bring your discharge papers / test results with you to your follow up appointment.      If you ever need assistance finding a doctor to follow up with, you may call the [facility] Patient Access Services helpline at [phone] to find names/contact #s for a provider/specialist to follow up with.    Rest / no strenuous activity.  Stay well hydrated.    A copy of your test results is being provided to you.  All results including incidental findings were reviewed at discharge.  Should you have any questions that arise after you are discharged, please feel free to contact the ER ([phone]) to have your questions answered.  We sent a repeat CMP test as the initial was hemolyzed and therefore results not accurate; we are awaiting the results of this test and will contact you with the results.      ***Return to the Emergency Department IMMEDIATELY if you experience any new / worsening symptoms or have any problems / concerns, including, but not limited to: heavy vaginal bleeding >1 pad/hour, severe abdominal pain refractory to pain medications, inability to tolerate anything by mouth with nausea/vomiting, chest pain, shortness of breath, lightheadedness, dizziness, fevers, or chills..***

## 2023-11-25 NOTE — ED PROVIDER NOTE - PATIENT PORTAL LINK FT
You can access the FollowMyHealth Patient Portal offered by [facility] by registering at the following website: [email or web address]. By joining [facility]’s FollowMyHealth portal, you will also be able to view your health information using other applications (apps) compatible with our system.

## 2023-11-26 LAB
CULTURE RESULTS: SIGNIFICANT CHANGE UP
CULTURE RESULTS: SIGNIFICANT CHANGE UP
SPECIMEN SOURCE: SIGNIFICANT CHANGE UP
SPECIMEN SOURCE: SIGNIFICANT CHANGE UP

## 2023-11-27 ENCOUNTER — APPOINTMENT (OUTPATIENT)
Dept: OBGYN | Facility: CLINIC | Age: 38
End: 2023-11-27
Payer: COMMERCIAL

## 2023-11-27 ENCOUNTER — ASOB RESULT (OUTPATIENT)
Age: 38
End: 2023-11-27

## 2023-11-27 VITALS — DIASTOLIC BLOOD PRESSURE: 85 MMHG | SYSTOLIC BLOOD PRESSURE: 135 MMHG | BODY MASS INDEX: 31.29 KG/M2 | WEIGHT: 188 LBS

## 2023-11-27 DIAGNOSIS — O03.9 COMPLETE OR UNSPECIFIED SPONTANEOUS ABORTION W/OUT COMPLICATION: ICD-10-CM

## 2023-11-27 PROCEDURE — 99213 OFFICE O/P EST LOW 20 MIN: CPT

## 2023-12-01 ENCOUNTER — APPOINTMENT (OUTPATIENT)
Dept: ANTEPARTUM | Facility: CLINIC | Age: 38
End: 2023-12-01

## 2023-12-01 ENCOUNTER — APPOINTMENT (OUTPATIENT)
Dept: OBGYN | Facility: CLINIC | Age: 38
End: 2023-12-01

## 2023-12-05 ENCOUNTER — TRANSCRIPTION ENCOUNTER (OUTPATIENT)
Age: 38
End: 2023-12-05

## 2023-12-22 ENCOUNTER — TRANSCRIPTION ENCOUNTER (OUTPATIENT)
Age: 38
End: 2023-12-22

## 2024-01-08 ENCOUNTER — APPOINTMENT (OUTPATIENT)
Dept: OBGYN | Facility: CLINIC | Age: 39
End: 2024-01-08
Payer: COMMERCIAL

## 2024-01-08 VITALS — BODY MASS INDEX: 31.62 KG/M2 | WEIGHT: 190 LBS | SYSTOLIC BLOOD PRESSURE: 109 MMHG | DIASTOLIC BLOOD PRESSURE: 74 MMHG

## 2024-01-08 DIAGNOSIS — Z31.69 ENCOUNTER FOR OTHER GENERAL COUNSELING AND ADVICE ON PROCREATION: ICD-10-CM

## 2024-01-08 PROCEDURE — 36415 COLL VENOUS BLD VENIPUNCTURE: CPT

## 2024-01-08 PROCEDURE — 99213 OFFICE O/P EST LOW 20 MIN: CPT

## 2024-01-13 LAB
ABO + RH PNL BLD: NORMAL
B19V IGG SER QL IA: POSITIVE
B19V IGG+IGM SER-IMP: NORMAL
B19V IGM FLD-ACNC: NEGATIVE
BLD GP AB SCN SERPL QL: NORMAL
CMV IGG SERPL QL: <0.2 U/ML
CMV IGG SERPL-IMP: NEGATIVE
CMV IGM SERPL QL: <8 AU/ML
CMV IGM SERPL QL: NEGATIVE
HBV E AG SER QL: NONREACTIVE
HCV AB SER QL: NONREACTIVE
HCV S/CO RATIO: 0.17 S/CO
HIV1+2 AB SPEC QL IA.RAPID: NONREACTIVE
RUBV IGG FLD-ACNC: 3.9 INDEX
RUBV IGG SER-IMP: POSITIVE
RUBV IGM FLD-ACNC: <20 AU/ML
T GONDII AB SER-IMP: NEGATIVE
T GONDII AB SER-IMP: NEGATIVE
T GONDII IGG SER QL: <3 IU/ML
T GONDII IGM SER QL: <3 AU/ML
T PALLIDUM AB SER QL IA: NEGATIVE
VZV AB TITR SER: POSITIVE
VZV IGG SER IF-ACNC: 1050 INDEX

## 2024-01-16 ENCOUNTER — APPOINTMENT (OUTPATIENT)
Dept: OBGYN | Facility: CLINIC | Age: 39
End: 2024-01-16
Payer: COMMERCIAL

## 2024-01-16 ENCOUNTER — APPOINTMENT (OUTPATIENT)
Dept: ANTEPARTUM | Facility: CLINIC | Age: 39
End: 2024-01-16
Payer: COMMERCIAL

## 2024-01-16 ENCOUNTER — ASOB RESULT (OUTPATIENT)
Age: 39
End: 2024-01-16

## 2024-01-16 VITALS
DIASTOLIC BLOOD PRESSURE: 85 MMHG | HEIGHT: 65 IN | BODY MASS INDEX: 31.82 KG/M2 | SYSTOLIC BLOOD PRESSURE: 139 MMHG | WEIGHT: 191 LBS

## 2024-01-16 PROCEDURE — 76830 TRANSVAGINAL US NON-OB: CPT

## 2024-01-16 PROCEDURE — 99214 OFFICE O/P EST MOD 30 MIN: CPT

## 2024-01-16 PROCEDURE — 36415 COLL VENOUS BLD VENIPUNCTURE: CPT

## 2024-01-16 PROCEDURE — 76857 US EXAM PELVIC LIMITED: CPT | Mod: 59

## 2024-01-16 NOTE — PLAN
[FreeTextEntry1] : 38 yr old for GYN sonogram  - preconception labs reviewed  - [facility] genetic screening sent  - GYN sonogram: large fibroid uterus, 5 myomas, largest 6cm. None in endometrial lining, see official sonogram for further information - discussed at length the process of YOVANA, all questions answered - f/u PRN

## 2024-01-16 NOTE — HISTORY OF PRESENT ILLNESS
[FreeTextEntry1] : 38 yr old for GYN sonogram. Patient saw Dr Perez on 01/08/2024 for preconception and clearance for YOVANA. Patient has appointment scheduled with Dr LUPIS Summers in a few weeks.

## 2024-01-16 NOTE — PROCEDURE
[Fibroid Uterus] : fibroid uterus [FreeTextEntry4] : large fibroid uterus, largest myoma 6cm. see official sonogram report for further information

## 2024-01-16 NOTE — COUNSELING
[Nutrition/ Exercise/ Weight Management] : nutrition, exercise, weight management [Body Image] : body image [Vitamins/Supplements] : vitamins/supplements [Preconception Care/ Fertility options] : preconception care, fertility options [Pregnancy Options] : pregnancy options

## 2024-01-29 ENCOUNTER — APPOINTMENT (OUTPATIENT)
Dept: HUMAN REPRODUCTION | Facility: CLINIC | Age: 39
End: 2024-01-29
Payer: COMMERCIAL

## 2024-01-29 PROCEDURE — 99205 OFFICE O/P NEW HI 60 MIN: CPT | Mod: 25

## 2024-01-29 PROCEDURE — 76830 TRANSVAGINAL US NON-OB: CPT

## 2024-01-29 PROCEDURE — 36415 COLL VENOUS BLD VENIPUNCTURE: CPT

## 2024-02-02 ENCOUNTER — TRANSCRIPTION ENCOUNTER (OUTPATIENT)
Age: 39
End: 2024-02-02

## 2024-02-05 ENCOUNTER — APPOINTMENT (OUTPATIENT)
Dept: OBGYN | Facility: CLINIC | Age: 39
End: 2024-02-05
Payer: COMMERCIAL

## 2024-02-05 PROCEDURE — 36415 COLL VENOUS BLD VENIPUNCTURE: CPT

## 2024-02-06 ENCOUNTER — TRANSCRIPTION ENCOUNTER (OUTPATIENT)
Age: 39
End: 2024-02-06

## 2024-02-06 LAB — HCG SERPL-MCNC: 3734 MIU/ML

## 2024-02-07 ENCOUNTER — NON-APPOINTMENT (OUTPATIENT)
Age: 39
End: 2024-02-07

## 2024-02-07 LAB
3-BETA-HYDROXYSTEROID DEHYDROGENASE II: NEGATIVE
3-HYDROXY-3-METHYLGLUTARYL-COA LYASE DEF: NEGATIVE
3-METHYLCROTONYL-COA CARBOXYLASE 1 DEFIC: NEGATIVE
3-METHYLCROTONYL-COA CARBOXYLASE 2 DEFIC: NEGATIVE
3-PHOSPHOGLYCERATE DEHYDROGENASE DEFICIE: NEGATIVE
6-PYRUVOYL-TETRAHYDROPTERIN SYNTHASE: NEGATIVE
ABETALIPOPROTEINEMIA: NEGATIVE
ACHONDROGENESIS, TYPE 1B: NEGATIVE
ACHROMATOPSIA: NEGATIVE
ACRODERMATITIS ENTEROPATHICA: NEGATIVE
ACUTE INFANTILE LIVER FAILURE: NEGATIVE
ACYL-COA OXIDASE I DEFICIENCY: NEGATIVE
ADRENOLEUKODYSTROPHY: NEGATIVE
AICARDI-GOUTIÈRES SYNDROME: NEGATIVE
ALPHA THALASSEMIA MENTAL RETARDATION SYN: NEGATIVE
ALPHA-MANNOSIDOSIS: NEGATIVE
ALPHA-THALASSEMIA: NEGATIVE
ALPORT SYNDROME, COL4A3-RELATED: NEGATIVE
ALPORT SYNDROME, COL4A4-RELATED: NEGATIVE
ALPORT SYNDROME, X-LINKED: NEGATIVE
ALSTROM SYNDROME: NEGATIVE
ANDERMANN SYNDROME: NEGATIVE
ARGININOSUCCINATE LYASE DEFICIENCY: NEGATIVE
AROMATASE DEFICIENCY: NEGATIVE
ASPARAGINE SYNTHETASE DEFICIENCY: NEGATIVE
ASPARTYLGLYCOSAMINURIA: NEGATIVE
ATAXIA WITH VITAMIN E DEFICIENCY: NEGATIVE
ATAXIA-TELANGIECTASIA: NEGATIVE
AUTISM SPECTRUM, EPILEPSY AND ARTHROGRYP: NEGATIVE
AUTOSOMAL RECESSIVE SPASTIC ATAXIA OF CH: NEGATIVE
BARDET-BIEDL SYNDROME, BBS1-RELATED: NEGATIVE
BARDET-BIEDL SYNDROME, BBS10-RELATED: NEGATIVE
BARDET-BIEDL SYNDROME, BBS12-RELATED: NEGATIVE
BARDET-BIEDL SYNDROME, BBS2-RELATED: NEGATIVE
BARE LYMPHOCYTE SYNDROME, TYPE II: NEGATIVE
BARTTER SYNDROME: NEGATIVE
BATTEN DISEASE: NEGATIVE
BETA-HEMOGLOBINOPATHIES: POSITIVE
BILATERAL FRONTOPARIETAL POLYMICROGYRIA: NEGATIVE
BIOTINIDASE DEFICIENCY: POSITIVE
BLOOM SYNDROME: NEGATIVE
CANAVAN DISEASE: NEGATIVE
CARBAMOYL PHOSPHATE SYNTHETASE I DEF: NEGATIVE
CARNITINE DEFICIENCY: NEGATIVE
CARNITINE PALMITOYLTRANSFERASE IA DEF: NEGATIVE
CARNITINE PALMITOYLTRANSFERASE II DEF: NEGATIVE
CARPENTER SYNDROME: NEGATIVE
CARTILAGE-HAIR HYPOPLASIA: NEGATIVE
CEREBROTENDINOUS XANTHOMATOSIS: NEGATIVE
CFTR MUT ANL BLD/T: NEGATIVE
CHARCOT-MARIE-TOOTH DISEASE WITH DEAFNES: NEGATIVE
CHARCOT-MARIE-TOOTH DISEASE, TYPE 4D: NEGATIVE
CHOREOACANTHOCYTOSIS: NEGATIVE
CHOROIDEREMIA: NEGATIVE
CHRONIC GRANULOMATOUS DISEASE, CYTOCHROM: NEGATIVE
CHRONIC GRANULOMATOUS DISEASE, X-LINKED: NEGATIVE
CILIOPATHIES, RPGRIP1L-RELATED: NEGATIVE
CITRIN DEFICIENCY: NEGATIVE
CITRULLINEMIA, TYPE I: NEGATIVE
COHEN SYNDROME: NEGATIVE
COMBINED MALONIC AND METHYLMALONIC ACIDU: NEGATIVE
COMBINED OXIDATIVE PHOSPHORYLATION DEF 3: NEGATIVE
COMBINED OXIDATIVE PHOSPHORYLATION DEF 4: NEGATIVE
COMBINED PITUITARY HORMONE DEFICIENCY-2: NEGATIVE
CONGENITAL ADRENAL HYPERPLASIA, 17-ALPHA: NEGATIVE
CONGENITAL AMEGAKARYOCYTIC THROMBOCYTOPE: NEGATIVE
CONGENITAL DISORDER OF GLYCOSYLATION 1C: NEGATIVE
CONGENITAL DISORDER OF GLYCOSYLATION IA: NEGATIVE
CONGENITAL DISORDER OF GLYCOSYLATION IB: NEGATIVE
CONGENITAL FINNISH NEPHROSIS: NEGATIVE
CONGENITAL INSENSIVITY TO PAIN WITH ANHI: NEGATIVE
CONGENITAL MYASTHENIC SYNDROME, CHRNE: NEGATIVE
CONGENITAL MYASTHENIC SYNDROME, RAPSN-RE: NEGATIVE
CONGENITAL NEUTROPENIA, HAX1-RELATED: NEGATIVE
CONGENITAL NEUTROPENIA, VPS45-RELATED: NEGATIVE
CORNEAL DYSTROPHY AND PERCEPTIVE DEAFNES: NEGATIVE
CORTICOSTERONE METHYLOXIDASE DEFICIENCY: NEGATIVE
COSTEFF DISEASE OPTIC ATROPHY: NEGATIVE
CRB1-RELATED RETINAL DYSTROPHIES: NEGATIVE
CREATINE TRANSPORTER DEFECT: NEGATIVE
CYSTINOSIS: NEGATIVE
D-BIFUNCTIONAL PROTEIN DEFICIENCY: NEGATIVE
DEAFNESS, AUTOSOMAL RECESSIVE 77: NEGATIVE
DMD GENE MUT ANL BLD/T: NEGATIVE
DYSKERATOSIS CONGENITA: NEGATIVE
DYSTROPHIC EPIDERMOLYSIS BULLOSA: NEGATIVE
EHLERS-DANLOS SYNDROME, TYPE VIIC: NEGATIVE
ELLIS-VAN CREVELD SYNDOME: NEGATIVE
EMERY-DREIFUSS MUSCULAR DYSTROPHY 1 X-LI: NEGATIVE
ENHANCED S-CONE SYNDROME: NEGATIVE
ETHYLMALONIC ENCEPHALOPATHY: NEGATIVE
FABRY DISEASE: NEGATIVE
FACTOR IX DEFICIENCY: NEGATIVE
FACTOR XI DEFICIENCY: NEGATIVE
FAMILIAL DYSAUTONOMIA: NEGATIVE
FAMILIAL HYPERCHOLESTEROLEMIA, LDLR: NEGATIVE
FAMILIAL HYPERCHOLESTEROLEMIA, LDLRAP1: NEGATIVE
FAMILIAL HYPERINSULINISM: NEGATIVE
FAMILIAL MEDITERRANEAN FEVER: NEGATIVE
FAMILIAL NEUROPOPHYSEAL DIABETES INSIPID: NEGATIVE
FANCONI ANEMIA, GROUP A: NEGATIVE
FANCONI ANEMIA, GROUP C: NEGATIVE
FANCONI ANEMIA, GROUP G: NEGATIVE
FRAGILE X PROTEIN (FMRP) BLD-IMP: NEGATIVE
FUMARASE DEFICIENCY: NEGATIVE
GALACTOKINASE DEFICIENCY: NEGATIVE
GALACTOSEMIA: NEGATIVE
GAUCHER DISEASE: NEGATIVE
GITELMAN SYNDROME: NEGATIVE
GLUTARIC ACIDEMIA, TYPE 2A: NEGATIVE
GLUTARIC ACIDEMIA, TYPE 2C: NEGATIVE
GLUTARYL-COA DEHYDROGENASE DEFICIENCY: NEGATIVE
GLYCINE ENCEPHALOPATHY, AMT-RELATED: NEGATIVE
GLYCINE ENCEPHALOPATHY: NEGATIVE
GLYCOGEN STORAGE DISEASE, TYPE 1A: NEGATIVE
GLYCOGEN STORAGE DISEASE, TYPE 2: NEGATIVE
GLYCOGEN STORAGE DISEASE, TYPE 3: NEGATIVE
GLYCOGEN STORAGE DISEASE, TYPE 4: NEGATIVE
GLYCOGEN STORAGE DISEASE, TYPE 5: NEGATIVE
GLYCOGEN STORAGE DISEASE, TYPE IB: NEGATIVE
GLYCOGEN STORAGE DISEASE, TYPE VII: NEGATIVE
GRACILE SYNDROME: NEGATIVE
GUANIDINOACETATE METHYLTRANSFERASE DEFIC: NEGATIVE
HEMOCHROMATOSIS, TYPE 2A: NEGATIVE
HEMOCHROMATOSIS, TYPE 3, TFR2-RELATED: NEGATIVE
HEREDITARY FRUCTOSE INTOLERANCE: NEGATIVE
HEREDITARY SPASTIC PARAPARESIS, TYPE 49: NEGATIVE
HERMANSKY-PUDLAK SYNDROME, HPS1-RELATED: NEGATIVE
HERMANSKY-PUDLAK SYNDROME, HPS3-RELATED: NEGATIVE
HEXOSAMINIDASE A & B BLD-IMP: NEGATIVE
HOLOCARBOXYLASE SYNTHETASE DEFICIENCY: NEGATIVE
HOMOCYSTINURIA DUE TO DEFIC OF MTHFR: NEGATIVE
HOMOCYSTINURIA, TYPE CBLE: NEGATIVE
HOMOCYSTINURIA: NEGATIVE
HYDROLETHALUS SYNDROME: NEGATIVE
HYPERINSULINEMIC HYPOGLYCEMIA: NEGATIVE
HYPERORNITHINEMIA-HYPERAMMONEMIA-HOMOCIT: NEGATIVE
HYPOHIDROTIC ECTODERMAL DYSPLASIA X-LINK: NEGATIVE
HYPOPHOSPHATASIA, AUTOSOMAL RECESSIVE: NEGATIVE
INCLUSION BODY MYOPATHY 2: NEGATIVE
INFANTILE CEREBRAL AND CEREBELLAR ATROPH: NEGATIVE
ISOVALERIC ACIDEMIA: NEGATIVE
JOUBERT SYNDROME 2: NEGATIVE
KETOTHIOLASE DEFICIENCY: NEGATIVE
KRABBE DISEASE: NEGATIVE
LAMELLAR ICHTHYOSIS, TYPE 1: NEGATIVE
LEBER CONGENITAL AMAUROSIS 2: NEGATIVE
LEBER CONGENITAL AMAUROSIS, TYPE CEP290: NEGATIVE
LEBER CONGENITAL AMAUROSIS, TYPE LCA5: NEGATIVE
LEBER CONGENITAL AMAUROSIS, TYPE RDH12: NEGATIVE
LEIGH SYNDROME, FRENCH-CANADIAN TYPE: NEGATIVE
LETHAL CONGENITAL CONTRACTURE SYNDROME 1: NEGATIVE
LEUKOENCEPHALOPATHY WITH VANISHING WHITE: NEGATIVE
LIMB GIRDLE MUSCULAR DYSTROPHY, TYPE 2A: NEGATIVE
LIMB GIRDLE MUSCULAR DYSTROPHY, TYPE 2B: NEGATIVE
LIMB GIRDLE MUSCULAR DYSTROPHY, TYPE 2I: NEGATIVE
LIMB-GIRDLE MUSCULAR DYSTROPHY, TYPE 2C: NEGATIVE
LIMB-GIRDLE MUSCULAR DYSTROPHY, TYPE 2D: NEGATIVE
LIMB-GIRDLE MUSCULAR DYSTROPHY, TYPE 2E: NEGATIVE
LIPOAMIDE DEHYDROGENASE DEFICIENCY: NEGATIVE
LIPOID ADRENAL HYPERPLASIA: NEGATIVE
LIPOPROTEIN LIPASE DEFICIENCY: NEGATIVE
LONG CHAIN 3-HYDROXYACYL-COA DEHYDROGENA: NEGATIVE
LYSINURIC PROTEIN INTOLERANCE: NEGATIVE
MAPLE SYRUP URINE DISEASE, TYPE 1A: NEGATIVE
MAPLE SYRUP URINE DISEASE, TYPE 1B: NEGATIVE
MECKEL-GRUBER SYNDROME, TYPE 1: NEGATIVE
MEDIUM CHAIN ACYL-COA DEHYDROGENASE DEFICIENCY: NEGATIVE
MEGALENCEPHALIC LEUKOENCEPHALOPATHY: NEGATIVE
METACHROMATIC LEUKODYSTROPHY GAUCHER: NEGATIVE
METACHROMATIC LEUKODYSTROPHY, ARSA: NEGATIVE
METHYLMALONIC ACIDURIA AND HOMOCYST CBIC: NEGATIVE
METHYLMALONIC ACIDURIA AND HOMOCYST CBID: NEGATIVE
METHYLMALONIC ACIDURIA, MMAA-RELATED: NEGATIVE
METHYLMALONIC ACIDURIA, MMAB-RELATED: NEGATIVE
METHYLMALONIC ACIDURIA, TYPE MUT(0): POSITIVE
MICROPHTHALMIA/ANOPHTHALMIA: NEGATIVE
MITOCHONDRIAL COMPLEX 1 DEFIC NDUFAF5: NEGATIVE
MITOCHONDRIAL COMPLEX 1 DEFIC NDUFS6: NEGATIVE
MITOCHONDRIAL DNA DEPLETION SYNDROME 6: NEGATIVE
MITOCHONDRIAL MYOPATHY AND SIDEROBLASTIC: NEGATIVE
MUCOLIPIDOSIS II/IIIA: NEGATIVE
MUCOLIPIDOSIS III GAMMA: NEGATIVE
MUCOLIPIDOSIS, TYPE IV: NEGATIVE
MUCOPOLYSACCHARIDOSIS, TYPE I: NEGATIVE
MUCOPOLYSACCHARIDOSIS, TYPE II: NEGATIVE
MUCOPOLYSACCHARIDOSIS, TYPE IIIA: NEGATIVE
MUCOPOLYSACCHARIDOSIS, TYPE IIIB: NEGATIVE
MUCOPOLYSACCHARIDOSIS, TYPE IIIC: NEGATIVE
MUCOPOLYSACCHARIDOSIS, TYPE IIID: NEGATIVE
MUCOPOLYSACCHARIDOSIS, TYPE IVB: NEGATIVE
MUCOPOLYSACCHARIDOSIS, TYPE IX: NEGATIVE
MUCOPOLYSACCHARIDOSIS, TYPE VI: NEGATIVE
MULTIPLE SULPHATASE DEFICIENCY: NEGATIVE
MUSCLE-EYE-BRAIN DISEASE, POMGNT1-RELATE: NEGATIVE
MYONEUROGASTROINTESTINAL ENCEPHALOPATHY: NEGATIVE
MYOTUBULAR MYOPATHY, X-LINKED: NEGATIVE
N-ACETYLGLUTAMATE SYNTHASE DEFICIENCY: NEGATIVE
NEMALINE MYOPATHY: NEGATIVE
NEURONAL CEROID LIPOFUSCINOSIS, CLN5: NEGATIVE
NEURONAL CEROID LIPOFUSCINOSIS, MFSD8: NEGATIVE
NEURONAL CEROID LIPOFUSCINOSIS, PPT1-REL: NEGATIVE
NEURONAL CEROID LIPOFUSCINOSIS, TPP1-REL: NEGATIVE
NEURONAL CEROID-LIPOFUSCINOSIS, CLN6: NEGATIVE
NEURONAL CEROID-LIPOFUSCINOSIS, CLN8: NEGATIVE
NIEMANN PICK DISEASE, TYPE C1/D: NEGATIVE
NIEMANN PICK DISEASE, TYPE C2: NEGATIVE
NIEMANN-PICK DISEASE, TYPES A/B: NEGATIVE
NIJMEGEN BREAKAGE SYNDROME: NEGATIVE
NON-SYNDROMIC HEARING LOSS, GJB2-RELATED: NEGATIVE
OCCIPITAL HORN SYNDROME: NEGATIVE
ODONTO-ONYCHO-DERMAL DYSPLASIA: NEGATIVE
OMENN SYNDROME: NEGATIVE
ORNITHINE AMINOTRANSFERASE DEFICIENCY: NEGATIVE
ORNITHINE TRANSCARBAMYLASE DEFICIENCY: NEGATIVE
OSTEOPETROSIS, INFANTILE MALIGNANT: NEGATIVE
PENDRED SYNDROME: NEGATIVE
PHENYLKETONURIA: NEGATIVE
PITUITARY HORMONE DEFICIENCY, COMBINED 3: NEGATIVE
POLYCYSTIC KIDNEY DISEASE, AUTOSOMAL RECESSIVE: NEGATIVE
POLYGLANDULAR AUTOIMMUNE SYNDROME: NEGATIVE
PONTOCEREBELLAR HYPOPLASIA, RARS2-RELATE: NEGATIVE
PONTOCEREBELLAR HYPOPLASIA, TYPE 1A: NEGATIVE
PRIMARY CILIARY DYSKINESIA DNAH5-RELATED: NEGATIVE
PRIMARY CILIARY DYSKINESIA DNAI1-RELATED: NEGATIVE
PRIMARY CILIARY DYSKINESIA DNAI2-RELATED: NEGATIVE
PRIMARY HYPEROXALURIA, TYPE 1: NEGATIVE
PRIMARY HYPEROXALURIA, TYPE 2: NEGATIVE
PRIMARY HYPEROXALURIA, TYPE 3: NEGATIVE
PROGRESSIVE CEREBELLO-CEREBRAL ATROPHY: NEGATIVE
PROGRESSIVE FAMILIAL INTRAHEPATIC CHOLES: NEGATIVE
PROPIONIC ACIDEMIA, ALPHA SUBUNIT: NEGATIVE
PROPIONIC ACIDEMIA, BETA SUBUNIT: NEGATIVE
PYCNODYSOSTOSIS: NEGATIVE
PYRUVATE DEHYDROGENASE DEFICIENCY AUTOSO: NEGATIVE
PYRUVATE DEHYDROGENASE DEFICIENCY X-LINK: NEGATIVE
RENAL TUBULAR ACIDOSIS AND DEAFNESS: NEGATIVE
REPRODUCTIVE CARRIER MULTIGENE ANL BLD/T: POSITIVE
RETINITIS PIGMENTOSA 25: NEGATIVE
RETINITIS PIGMENTOSA 26: NEGATIVE
RETINITIS PIGMENTOSA 28: NEGATIVE
RETINITIS PIGMENTOSA 59: NEGATIVE
RHIZOMELIC CHONDRODYSPLASIA PUNCTATA 3: NEGATIVE
RHIZOMELIC CHONDRODYSPLASIA PUNCTATA I: NEGATIVE
RIBOFLAVIN RESPONSIVE COMPLEX 1 DEF: NEGATIVE
ROBERTS SYNDROME: NEGATIVE
SALLA DISEASE: NEGATIVE
SANDHOFF DISEASE: NEGATIVE
SCHIMKE IMMUNOOSSEOUS DYSPLASIA: NEGATIVE
SEGAWA SYNDROME, AUTOSOMAL RECESSIVE: NEGATIVE
SEVERE COMBINED IMMUNODEFICIENCY, TYPE A: NEGATIVE
SEVERE COMBINED IMMUNODEFICIENCY: NEGATIVE
SJOGREN-LARSSON SYNDROME: NEGATIVE
SMITH-LEMLI-OPITZ SYNDROME: NEGATIVE
SMN1 GENE MUT ANL BLD/T: NEGATIVE
SPONDYLOTHORACIC DYSOSTOSIS: NEGATIVE
STEROID-RESISTANT NEPHROTIC SYNDROME: NEGATIVE
STUVE-WIEDEMANN SYNDROME: NEGATIVE
TEST PERFORMANCE INFO SPEC: NORMAL
TYROSINEMIA, TYPE I: NEGATIVE
USHER SYNDROME, TYPE 1B: NEGATIVE
USHER SYNDROME, TYPE 1C: NEGATIVE
USHER SYNDROME, TYPE 1D: NEGATIVE
USHER SYNDROME, TYPE 1F: NEGATIVE
USHER SYNDROME, TYPE 2A: NEGATIVE
USHER SYNDROME, TYPE 3: NEGATIVE
VERY LONG CHAIN ACYL-COA DEHYDROGENASE: NEGATIVE
WALKER-WARBURG SYNDROME: NEGATIVE
WILSON DISEASE: NEGATIVE
WOLMAN DISEASE: NEGATIVE
X-LINKED JUVENILE RETINOSCHISIS: NEGATIVE
X-LINKED SEVERE COMBINED IMMUNODEFICIENC: NEGATIVE
ZELLWEGER SPECTRUM DISORDERS, PEX1-RELAT: NEGATIVE
ZELLWEGER SPECTRUM DISORDERS, PEX10-RELA: NEGATIVE
ZELLWEGER SPECTRUM DISORDERS, PEX2-RELAT: NEGATIVE
ZELLWEGER SPECTRUM DISORDERS, PEX6-RELAT: NEGATIVE

## 2024-02-22 ENCOUNTER — APPOINTMENT (OUTPATIENT)
Dept: ANTEPARTUM | Facility: CLINIC | Age: 39
End: 2024-02-22
Payer: COMMERCIAL

## 2024-02-22 ENCOUNTER — APPOINTMENT (OUTPATIENT)
Dept: OBGYN | Facility: CLINIC | Age: 39
End: 2024-02-22
Payer: COMMERCIAL

## 2024-02-22 ENCOUNTER — ASOB RESULT (OUTPATIENT)
Age: 39
End: 2024-02-22

## 2024-02-22 VITALS
WEIGHT: 193 LBS | BODY MASS INDEX: 32.15 KG/M2 | HEIGHT: 65 IN | DIASTOLIC BLOOD PRESSURE: 84 MMHG | SYSTOLIC BLOOD PRESSURE: 127 MMHG

## 2024-02-22 DIAGNOSIS — N92.6 IRREGULAR MENSTRUATION, UNSPECIFIED: ICD-10-CM

## 2024-02-22 PROCEDURE — 76801 OB US < 14 WKS SINGLE FETUS: CPT

## 2024-02-22 PROCEDURE — 99213 OFFICE O/P EST LOW 20 MIN: CPT

## 2024-02-22 PROCEDURE — 36415 COLL VENOUS BLD VENIPUNCTURE: CPT

## 2024-02-23 NOTE — PROCEDURE
[Transabdominal OB Sonogram WNL] : Transabdominal OB Sonogram WNL [FreeTextEntry1] : KENJI, 7w4d, , ALEXIS 10/6/24

## 2024-02-23 NOTE — HISTORY OF PRESENT ILLNESS
[FreeTextEntry1] : Patient is a 38 year  old . LMP 23. She had a positive home pregnancy test and uptrending HCG. She did have an additional episode of VB 24.   OBHx: 2023 - SAB

## 2024-02-23 NOTE — PHYSICAL EXAM
[Appropriately responsive] : appropriately responsive [Alert] : alert [No Acute Distress] : no acute distress [Soft] : soft [Non-tender] : non-tender [Non-distended] : non-distended [No Lesions] : no lesions [No HSM] : No HSM [Oriented x3] : oriented x3 [No Mass] : no mass

## 2024-02-23 NOTE — DISCUSSION/SUMMARY
[FreeTextEntry1] : 38 year old P0 at 7w4d by LMP presenting with amenorrhea -f/u progesterone and PNL drawn today -continue PNV -Routine PNC reviewed including nutrition, exercise and safe medications in pregnancy  -High Risk Conditions: AMA, h/o 1st tri SAB, patient reports that she is unable to take ASA 81 due to G6PD deficiency - will discuss with MFM -f/u 3 weeks for NIPT bloodwork

## 2024-02-24 ENCOUNTER — TRANSCRIPTION ENCOUNTER (OUTPATIENT)
Age: 39
End: 2024-02-24

## 2024-02-24 LAB
FERRITIN SERPL-MCNC: 51 NG/ML
FOLATE SERPL-MCNC: >20 NG/ML
HAV IGM SER QL: NONREACTIVE
HBV CORE IGM SER QL: NONREACTIVE
HBV SURFACE AG SER QL: NONREACTIVE
HCV AB SER QL: NONREACTIVE
HCV S/CO RATIO: 0.15 S/CO
HIV1+2 AB SPEC QL IA.RAPID: NONREACTIVE
IRON SATN MFR SERPL: 62 %
IRON SERPL-MCNC: 194 UG/DL
MEV IGG FLD QL IA: 30.4 AU/ML
MEV IGG+IGM SER-IMP: POSITIVE
MUV AB SER-ACNC: POSITIVE
MUV IGG SER QL IA: 73.2 AU/ML
PROGEST SERPL-MCNC: 26.4 NG/ML
RUBV IGG FLD-ACNC: 2.8 INDEX
RUBV IGG SER-IMP: POSITIVE
TIBC SERPL-MCNC: 312 UG/DL
UIBC SERPL-MCNC: 117 UG/DL
VIT B12 SERPL-MCNC: 1097 PG/ML

## 2024-02-25 LAB
M TB IFN-G BLD-IMP: NEGATIVE
QUANTIFERON TB PLUS MITOGEN MINUS NIL: >10 IU/ML
QUANTIFERON TB PLUS NIL: 0.01 IU/ML
QUANTIFERON TB PLUS TB1 MINUS NIL: 0 IU/ML
QUANTIFERON TB PLUS TB2 MINUS NIL: 0 IU/ML

## 2024-02-26 ENCOUNTER — TRANSCRIPTION ENCOUNTER (OUTPATIENT)
Age: 39
End: 2024-02-26

## 2024-03-14 ENCOUNTER — APPOINTMENT (OUTPATIENT)
Dept: OBGYN | Facility: CLINIC | Age: 39
End: 2024-03-14
Payer: COMMERCIAL

## 2024-03-14 VITALS
BODY MASS INDEX: 31.65 KG/M2 | SYSTOLIC BLOOD PRESSURE: 121 MMHG | DIASTOLIC BLOOD PRESSURE: 79 MMHG | WEIGHT: 190 LBS | HEIGHT: 65 IN

## 2024-03-14 DIAGNOSIS — Z3A.10 10 WEEKS GESTATION OF PREGNANCY: ICD-10-CM

## 2024-03-14 PROCEDURE — 36415 COLL VENOUS BLD VENIPUNCTURE: CPT

## 2024-03-14 PROCEDURE — 0502F SUBSEQUENT PRENATAL CARE: CPT

## 2024-03-19 ENCOUNTER — NON-APPOINTMENT (OUTPATIENT)
Age: 39
End: 2024-03-19

## 2024-03-19 ENCOUNTER — EMERGENCY (EMERGENCY)
Facility: HOSPITAL | Age: 39
LOS: 1 days | Discharge: STILL ADMITTED OR ADMINISTRATIVE | End: 2024-03-19
Attending: EMERGENCY MEDICINE | Admitting: EMERGENCY MEDICINE
Payer: COMMERCIAL

## 2024-03-19 ENCOUNTER — TRANSCRIPTION ENCOUNTER (OUTPATIENT)
Age: 39
End: 2024-03-19

## 2024-03-19 VITALS
SYSTOLIC BLOOD PRESSURE: 124 MMHG | RESPIRATION RATE: 18 BRPM | DIASTOLIC BLOOD PRESSURE: 81 MMHG | TEMPERATURE: 99 F | HEART RATE: 97 BPM | OXYGEN SATURATION: 99 %

## 2024-03-19 LAB
APPEARANCE UR: ABNORMAL
BACTERIA # UR AUTO: ABNORMAL /HPF
BILIRUB UR-MCNC: NEGATIVE — SIGNIFICANT CHANGE UP
CAST: 0 /LPF — SIGNIFICANT CHANGE UP (ref 0–4)
COLOR SPEC: YELLOW — SIGNIFICANT CHANGE UP
DIFF PNL FLD: ABNORMAL
GLUCOSE UR QL: NEGATIVE MG/DL — SIGNIFICANT CHANGE UP
KETONES UR-MCNC: NEGATIVE MG/DL — SIGNIFICANT CHANGE UP
LEUKOCYTE ESTERASE UR-ACNC: ABNORMAL
NITRITE UR-MCNC: NEGATIVE — SIGNIFICANT CHANGE UP
PH UR: 7 — SIGNIFICANT CHANGE UP (ref 5–8)
PROT UR-MCNC: 30 MG/DL
RBC CASTS # UR COMP ASSIST: 17 /HPF — HIGH (ref 0–4)
SP GR SPEC: 1.02 — SIGNIFICANT CHANGE UP (ref 1–1.03)
SQUAMOUS # UR AUTO: 3 /HPF — SIGNIFICANT CHANGE UP (ref 0–5)
UROBILINOGEN FLD QL: 1 MG/DL — SIGNIFICANT CHANGE UP (ref 0.2–1)
WBC UR QL: 4 /HPF — SIGNIFICANT CHANGE UP (ref 0–5)

## 2024-03-19 PROCEDURE — 99285 EMERGENCY DEPT VISIT HI MDM: CPT

## 2024-03-19 PROCEDURE — 76815 OB US LIMITED FETUS(S): CPT | Mod: 26

## 2024-03-19 NOTE — ED PROVIDER NOTE - OBJECTIVE STATEMENT
37 yo  (prior miscarriage at 6 weeks) with LMP on 24 here with vaginal bleeding that began last night (minimal blood). Denies f/c, sob, cp, n/v, abdominal pain. The pt has had a confirmatory ultrasound with [facility] for this pregnancy. 11 weeks pregnant ALEXIS oct 6

## 2024-03-19 NOTE — ED PROVIDER NOTE - CLINICAL SUMMARY MEDICAL DECISION MAKING FREE TEXT BOX
Will confirm IUP with ultrasound and chart review shows that the patient is O positive so the pt will not require rhogam. Will also send ua, uc/s and will likely dc to follow with pmd as an outpt.

## 2024-03-19 NOTE — ED ADULT NURSE NOTE - OBJECTIVE STATEMENT
A&Ox4. ambulatory. c/o blood in urine. PT states she is currently 11 weeks pregnant. NAD. pt denies SOB, chest pain, dizziness, weakness, urinary symptoms, HA, n/v/d, fevers, chills, pain. respirations are even and un labored. skin intact. safety precautions maintained,.

## 2024-03-19 NOTE — ED PROVIDER NOTE - NSFOLLOWUPINSTRUCTIONS_ED_ALL_ED_FT
Please follow up with your OB for your scheduled appointment on 3/28 and please return to the ER for worsening or persistent symptoms, and/or ANY NEW OR CONCERNING SYMPTOMS.     Advance activity as tolerated.  Return to the ER for worsening or persistent symptoms, and/or ANY NEW OR CONCERNING SYMPTOMS. If you have issues obtaining follow up, please call: [phone] to obtain a doctor or specialist who takes your insurance in your area.  You may call [phone] to make an appointment with the internal medicine clinic.

## 2024-03-19 NOTE — ED ADULT NURSE NOTE - SUICIDE SCREENING QUESTION 1
[facility] ENDOSCOPY  Outpatient Procedure H&P    Patient: Melinda Ochoa MRN: [id number]     YOB: 1966  Age: 60 y.o. Sex: male    Unit: [facility] Room/Bed: ENDO/NONE Location: [address]     Procedure: Procedure(s):  COLON W/ANES. (12:00)    Indication:Hx of colon polyps  Referring  Physician:        Brief history: last colon 6 yrs ago    Nurses past medical history notes reviewed and agreed. Medications reviewed. Allergies: Patient has no known allergies.      Allergies noted: Yes     Past Medical History:   Past Medical History:   Diagnosis Date    CAD (coronary artery disease) 2016    Diabetes mellitus ([facility] [address]     History of CVA (cerebrovascular accident) 2/1/2020    Hyperlipidemia     Hypertension     MI (myocardial infarction) ([facility]     No history of procedure 06/01/2017    colonoscopy    Pneumonia due to organism 8/3/2019       Past Surgical History:   Past Surgical History:   Procedure Laterality Date    CHOLECYSTECTOMY, LAPAROSCOPIC N/A 4/17/2019    LAPAROSCOPIC CHOLECYSTECTOMY WITH INTRAOPERATIVE CHOLANGIOGRAM performed by Mike Cherry MD at [address]  06/01/2017    Colonic polyp    CORONARY ARTERY BYPASS GRAFT  09/20/2016    LIMA- LAD, Reverse SVG- Rosal[place], reverse SVG- PDA, Reverse SVG- OM seq- Diag    EYE SURGERY Right     drain placed behind eye    HERNIA REPAIR Bilateral 2005    bilateral inguinal hernia repair    PTCA  03/09/2020    SEGUNDO- 3.0 x 34 to pCx       Social History:   Social History     Socioeconomic History    Marital status:      Spouse name: Not on file    Number of children: Not on file    Years of education: Not on file    Highest education level: Not on file   Occupational History    Not on file   Tobacco Use    Smoking status: Never    Smokeless tobacco: Never   Vaping Use    Vaping Use: Never used   Substance and Sexual Activity    Alcohol use: No    Drug use: No    Sexual activity: Yes     Partners: Female   Other
No

## 2024-03-19 NOTE — ED ADULT NURSE NOTE - NSFALLUNIVINTERV_ED_ALL_ED
Bed/Stretcher in lowest position, wheels locked, appropriate side rails in place/Call bell, personal items and telephone in reach/Instruct patient to call for assistance before getting out of bed/chair/stretcher/Non-slip footwear applied when patient is off stretcher/[place] to call system/Physically safe environment - no spills, clutter or unnecessary equipment/Purposeful proactive rounding/Room/bathroom lighting operational, light cord in reach

## 2024-03-20 LAB
CHROMOSOME13 INTERPRETATION: NORMAL
CHROMOSOME13 TEST RESULT: NORMAL
CHROMOSOME18 INTERPRETATION: NORMAL
CHROMOSOME18 TEST RESULT: NORMAL
CHROMOSOME21 INTERPRETATION: NORMAL
CHROMOSOME21 TEST RESULT: NORMAL
CULTURE RESULTS: SIGNIFICANT CHANGE UP
FETAL FRACTION: NORMAL
PERFORMANCE AND LIMITATIONS: NORMAL
SEX CHROMOSOME INTERPRETATION: NORMAL
SEX CHROMOSOME TEST RESULT: NORMAL
SPECIMEN SOURCE: SIGNIFICANT CHANGE UP
VERIFI PRENATAL TEST: NOT DETECTED

## 2024-03-28 ENCOUNTER — APPOINTMENT (OUTPATIENT)
Dept: ANTEPARTUM | Facility: CLINIC | Age: 39
End: 2024-03-28
Payer: COMMERCIAL

## 2024-03-28 ENCOUNTER — ASOB RESULT (OUTPATIENT)
Age: 39
End: 2024-03-28

## 2024-03-28 ENCOUNTER — APPOINTMENT (OUTPATIENT)
Dept: OBGYN | Facility: CLINIC | Age: 39
End: 2024-03-28
Payer: COMMERCIAL

## 2024-03-28 VITALS
SYSTOLIC BLOOD PRESSURE: 115 MMHG | BODY MASS INDEX: 32.15 KG/M2 | DIASTOLIC BLOOD PRESSURE: 78 MMHG | HEIGHT: 65 IN | WEIGHT: 193 LBS

## 2024-03-28 PROCEDURE — 76813 OB US NUCHAL MEAS 1 GEST: CPT

## 2024-03-28 PROCEDURE — 36415 COLL VENOUS BLD VENIPUNCTURE: CPT

## 2024-03-28 PROCEDURE — 0502F SUBSEQUENT PRENATAL CARE: CPT

## 2024-03-28 PROCEDURE — 76801 OB US < 14 WKS SINGLE FETUS: CPT | Mod: 59

## 2024-04-10 ENCOUNTER — TRANSCRIPTION ENCOUNTER (OUTPATIENT)
Age: 39
End: 2024-04-10

## 2024-04-11 ENCOUNTER — TRANSCRIPTION ENCOUNTER (OUTPATIENT)
Age: 39
End: 2024-04-11

## 2024-04-11 RX ORDER — ASPIRIN ENTERIC COATED TABLETS 81 MG 81 MG/1
81 TABLET, DELAYED RELEASE ORAL DAILY
Qty: 30 | Refills: 2 | Status: ACTIVE | COMMUNITY
Start: 2024-04-11 | End: 1900-01-01

## 2024-04-23 ENCOUNTER — APPOINTMENT (OUTPATIENT)
Dept: OBGYN | Facility: CLINIC | Age: 39
End: 2024-04-23
Payer: COMMERCIAL

## 2024-04-23 VITALS
DIASTOLIC BLOOD PRESSURE: 74 MMHG | BODY MASS INDEX: 32.82 KG/M2 | SYSTOLIC BLOOD PRESSURE: 115 MMHG | HEIGHT: 65 IN | WEIGHT: 197 LBS

## 2024-04-23 DIAGNOSIS — Z34.82 ENCOUNTER FOR SUPERVISION OF OTHER NORMAL PREGNANCY, SECOND TRIMESTER: ICD-10-CM

## 2024-04-23 PROCEDURE — 36415 COLL VENOUS BLD VENIPUNCTURE: CPT

## 2024-04-23 PROCEDURE — 0502F SUBSEQUENT PRENATAL CARE: CPT

## 2024-04-30 LAB
AFP MOM: 1.2
AFP VALUE: 39 NG/ML
ALPHA FETOPROTEIN SERUM COMMENT: NORMAL
ALPHA FETOPROTEIN SERUM INTERPRETATION: NORMAL
ALPHA FETOPROTEIN SERUM RESULTS: NORMAL
ALPHA FETOPROTEIN SERUM TEST RESULTS: NORMAL
GESTATIONAL AGE BASED ON: NORMAL
GESTATIONAL AGE ON COLLECTION DATE: 16.3 WEEKS
INSULIN DEP DIABETES: NO
MATERNAL AGE AT EDD AFP: 41.1 YR
MULTIPLE GESTATION: NO
OSBR RISK 1 IN: NORMAL
RACE: NORMAL
WEIGHT AFP: 197 LBS

## 2024-05-22 ENCOUNTER — ASOB RESULT (OUTPATIENT)
Age: 39
End: 2024-05-22

## 2024-05-22 ENCOUNTER — APPOINTMENT (OUTPATIENT)
Dept: ANTEPARTUM | Facility: CLINIC | Age: 39
End: 2024-05-22
Payer: COMMERCIAL

## 2024-05-22 ENCOUNTER — APPOINTMENT (OUTPATIENT)
Dept: OBGYN | Facility: CLINIC | Age: 39
End: 2024-05-22
Payer: COMMERCIAL

## 2024-05-22 VITALS
WEIGHT: 202 LBS | HEIGHT: 65 IN | SYSTOLIC BLOOD PRESSURE: 108 MMHG | DIASTOLIC BLOOD PRESSURE: 73 MMHG | BODY MASS INDEX: 33.66 KG/M2

## 2024-05-22 DIAGNOSIS — O35.BXX0 MATERNAL CARE FOR OTHER (SUSPECTED) FETAL ABNORMALITY AND DAMAGE, FETAL CARDIAC ANOMALIES, NOT APPLICABLE OR UNSPECIFIED: ICD-10-CM

## 2024-05-22 DIAGNOSIS — O28.3 ABNORMAL ULTRASONIC FINDING ON ANTENATAL SCREENING OF MOTHER: ICD-10-CM

## 2024-05-22 PROCEDURE — 0502F SUBSEQUENT PRENATAL CARE: CPT

## 2024-05-22 PROCEDURE — 76811 OB US DETAILED SNGL FETUS: CPT

## 2024-05-23 ENCOUNTER — APPOINTMENT (OUTPATIENT)
Dept: PEDIATRIC CARDIOLOGY | Facility: CLINIC | Age: 39
End: 2024-05-23
Payer: COMMERCIAL

## 2024-05-23 PROCEDURE — 93325 DOPPLER ECHO COLOR FLOW MAPG: CPT | Mod: 59

## 2024-05-23 PROCEDURE — 76825 ECHO EXAM OF FETAL HEART: CPT

## 2024-05-23 PROCEDURE — 76827 ECHO EXAM OF FETAL HEART: CPT

## 2024-05-23 PROCEDURE — 99204 OFFICE O/P NEW MOD 45 MIN: CPT | Mod: 25

## 2024-05-23 PROCEDURE — 76820 UMBILICAL ARTERY ECHO: CPT

## 2024-05-30 ENCOUNTER — NON-APPOINTMENT (OUTPATIENT)
Age: 39
End: 2024-05-30

## 2024-06-02 LAB
11Q23 DELETION (JACOBSEN): NEGATIVE
15Q11 DELETION (PW ANGELMAN): NEGATIVE
1P36 DELETION SYNDROME: NEGATIVE
22Q11 DELETION (DIGEORGE): NEGATIVE
4P16 DELETION(WOLF-HIRSCHHORN): NEGATIVE
5P15 DELETION (CRI-DU-CHAT): NEGATIVE
8Q24 DELETION (LANGER-GIEDION): NEGATIVE
ABOUT THE TEST: NORMAL
APPROVED BY: NORMAL
FETAL FRACTION: NORMAL
FETAL SEX: NORMAL
GAINS/LOSSES >=7 MB: NEGATIVE
GESTATION: NORMAL
GESTATIONAL AGE > OR = 9W:: YES
LAB DIRECTOR COMMENTS: NORMAL
LIMITATIONS OF THE TEST: NORMAL
MONOSOMY X (TURNER SYNDROME): NEGATIVE
NOTE: NORMAL
OTHER AUTOSOMAL ANEUPLOIDIES: NEGATIVE
PERFORMANCE CHARACTERISTICS: NORMAL
PERFORMANCE: NORMAL
POSITIVE PREDICTIVE VALUE: NORMAL
REFERENCES: NORMAL
TEST METHOD: NORMAL
TEST RESULT: NEGATIVE
TRISOMY 13 (PATAU SYNDROME): NEGATIVE
TRISOMY 18 (EDWARDS SYNDROME): NEGATIVE
TRISOMY 21 (DOWN SYNDROME): NEGATIVE
XXX (TRIPLE X SYNDROME): NEGATIVE
XXY (KLINEFELTER SYNDROME): NEGATIVE
XYY (JACOBS SYNDROME): NEGATIVE

## 2024-06-06 ENCOUNTER — TRANSCRIPTION ENCOUNTER (OUTPATIENT)
Age: 39
End: 2024-06-06

## 2024-06-10 ENCOUNTER — EMERGENCY (EMERGENCY)
Facility: HOSPITAL | Age: 39
LOS: 1 days | Discharge: STILL ADMITTED OR ADMINISTRATIVE | End: 2024-06-10
Admitting: EMERGENCY MEDICINE
Payer: COMMERCIAL

## 2024-06-10 ENCOUNTER — TRANSCRIPTION ENCOUNTER (OUTPATIENT)
Age: 39
End: 2024-06-10

## 2024-06-10 ENCOUNTER — OUTPATIENT (OUTPATIENT)
Dept: INPATIENT UNIT | Facility: HOSPITAL | Age: 39
LOS: 1 days | Discharge: HOME | End: 2024-06-10
Payer: COMMERCIAL

## 2024-06-10 ENCOUNTER — APPOINTMENT (OUTPATIENT)
Dept: ANTEPARTUM | Facility: CLINIC | Age: 39
End: 2024-06-10
Payer: COMMERCIAL

## 2024-06-10 ENCOUNTER — ASOB RESULT (OUTPATIENT)
Age: 39
End: 2024-06-10

## 2024-06-10 VITALS
RESPIRATION RATE: 18 BRPM | WEIGHT: 192.02 LBS | OXYGEN SATURATION: 99 % | DIASTOLIC BLOOD PRESSURE: 74 MMHG | HEART RATE: 91 BPM | SYSTOLIC BLOOD PRESSURE: 114 MMHG | TEMPERATURE: 98 F | HEIGHT: 65 IN

## 2024-06-10 VITALS
RESPIRATION RATE: 16 BRPM | SYSTOLIC BLOOD PRESSURE: 112 MMHG | DIASTOLIC BLOOD PRESSURE: 74 MMHG | TEMPERATURE: 98 F | HEART RATE: 91 BPM

## 2024-06-10 VITALS — DIASTOLIC BLOOD PRESSURE: 67 MMHG | SYSTOLIC BLOOD PRESSURE: 110 MMHG | HEART RATE: 80 BPM

## 2024-06-10 DIAGNOSIS — O26.899 OTHER SPECIFIED PREGNANCY RELATED CONDITIONS, UNSPECIFIED TRIMESTER: ICD-10-CM

## 2024-06-10 LAB
APPEARANCE UR: CLEAR — SIGNIFICANT CHANGE UP
BILIRUB UR-MCNC: NEGATIVE — SIGNIFICANT CHANGE UP
COLOR SPEC: YELLOW — SIGNIFICANT CHANGE UP
DIFF PNL FLD: NEGATIVE — SIGNIFICANT CHANGE UP
GLUCOSE UR QL: NEGATIVE MG/DL — SIGNIFICANT CHANGE UP
KETONES UR-MCNC: NEGATIVE MG/DL — SIGNIFICANT CHANGE UP
LEUKOCYTE ESTERASE UR-ACNC: NEGATIVE — SIGNIFICANT CHANGE UP
NITRITE UR-MCNC: NEGATIVE — SIGNIFICANT CHANGE UP
PH UR: 7.5 — SIGNIFICANT CHANGE UP (ref 5–8)
PROT UR-MCNC: NEGATIVE MG/DL — SIGNIFICANT CHANGE UP
SP GR SPEC: 1.01 — SIGNIFICANT CHANGE UP (ref 1–1.03)
UROBILINOGEN FLD QL: 0.2 MG/DL — SIGNIFICANT CHANGE UP (ref 0.2–1)

## 2024-06-10 PROCEDURE — 76815 OB US LIMITED FETUS(S): CPT | Mod: 26

## 2024-06-10 PROCEDURE — 76817 TRANSVAGINAL US OBSTETRIC: CPT | Mod: 26

## 2024-06-10 PROCEDURE — L9996: CPT

## 2024-06-10 PROCEDURE — 99221 1ST HOSP IP/OBS SF/LOW 40: CPT

## 2024-06-10 RX ORDER — INDOMETHACIN 50 MG
50 CAPSULE ORAL ONCE
Refills: 0 | Status: COMPLETED | OUTPATIENT
Start: 2024-06-10 | End: 2024-06-10

## 2024-06-10 RX ORDER — FAMOTIDINE 10 MG/ML
20 INJECTION INTRAVENOUS DAILY
Refills: 0 | Status: ACTIVE | OUTPATIENT
Start: 2024-06-10 | End: 2025-05-09

## 2024-06-10 RX ADMIN — Medication 50 MILLIGRAM(S): at 23:16

## 2024-06-10 RX ADMIN — Medication 50 MILLIGRAM(S): at 22:16

## 2024-06-10 RX ADMIN — FAMOTIDINE 20 MILLIGRAM(S): 10 INJECTION INTRAVENOUS at 22:08

## 2024-06-10 NOTE — OB PROVIDER TRIAGE NOTE - NSHPPHYSICALEXAM_GEN_ALL_CORE
abdomen: soft, nt on palp  SSE: cervix appears closed and posterior   sono reports in ASOB  sono images saved in ASOB   TVS: 3.87 -3.98 cm no dynamic changes   TAS:   transverse FH- 161 bpm anterior placenta MVP: 7.96   T(C): 36.9 (06-10-24 @ 19:20), Max: 36.9 (06-10-24 @ 17:56)  HR: 86 (06-10-24 @ 19:54) (85 - 91)  BP: 108/60 (06-10-24 @ 19:54) (108/60 - 114/74)  RR: 18 (06-10-24 @ 19:20) (16 - 18)  SpO2: 99% (06-10-24 @ 17:21) (99% - 99%) abdomen: soft, nt on palp  SSE: cervix appears closed and posterior   sono reports in ASOB  sono images saved in ASOB   TVS: 3.87 -3.98 cm no dynamic changes   TAS:   transverse FH- 161 bpm posterior placenta MVP: 7.96   T(C): 36.9 (06-10-24 @ 19:20), Max: 36.9 (06-10-24 @ 17:56)  HR: 86 (06-10-24 @ 19:54) (85 - 91)  BP: 108/60 (06-10-24 @ 19:54) (108/60 - 114/74)  RR: 18 (06-10-24 @ 19:20) (16 - 18)  SpO2: 99% (06-10-24 @ 17:21) (99% - 99%)  NST in progress abdomen: soft, nt on palp  SSE: cervix appears closed and posterior   sono reports in ASOB  sono images saved in ASOB   TVS: 3.87 -3.98 cm no dynamic changes   TAS:   transverse FH- 161 bpm posterior placenta MVP: 7.96   T(C): 36.9 (06-10-24 @ 19:20), Max: 36.9 (06-10-24 @ 17:56)  HR: 86 (06-10-24 @ 19:54) (85 - 91)  BP: 108/60 (06-10-24 @ 19:54) (108/60 - 114/74)  RR: 18 (06-10-24 @ 19:20) (16 - 18)  SpO2: 99% (06-10-24 @ 17:21) (99% - 99%)  NST in progress    addendum @ 2325  FH baseline 150 Fh variability mod +FH accels FH variable decels   toco: irregular uterine contractions noted

## 2024-06-10 NOTE — OB PROVIDER TRIAGE NOTE - NSOBPROVIDERNOTE_OBGYN_ALL_OB_FT
2145 : Dr Farmer @ bedside   reviewed EFM tracing  plan of care reviewed with patient   pt for Indocin 50 mg po x's 1  Pepcid 20 mg po x's 1   will continue to monitor 2145 : Dr Farmer @ bedside   reviewed EFM tracing  plan of care reviewed with patient   pt for Indocin 50 mg po x's 1  Pepcid 20 mg po x's 1   will continue to monitor    2208 pepcid 20 mg po x's 1  2216 Indocin 50 mg po x's 1   will continue to monitor 2145 : Dr Quinonez @ bedside   reviewed EFM tracing  plan of care reviewed with patient   pt for Indocin 50 mg po x's 1  Pepcid 20 mg po x's 1   will continue to monitor    2208 pepcid 20 mg po x's 1  2216 Indocin 50 mg po x's 1   will continue to monitor    2315  pt states she is feeling better , states her pain is a 4/10 on pain scale  maternal and fetal status reassuring   abdominal pain likely related to myomas   plan of care d/w dr Quinonez [place]   pt to be discharged home   PTL precautions d/w pt  increase fluid intake  instructed on fetal kickcounts   RX: Indocin 25 mg every 6 hours x's 48 hours next dose 0400  w/v discharge instructions given  discharged home     discharged @ 2330 2145 : Dr Quinonez @ bedside   reviewed EFM tracing  plan of care reviewed with patient   pt for Indocin 50 mg po x's 1  Pepcid 20 mg po x's 1   will continue to monitor    2208 pepcid 20 mg po x's 1  2216 Indocin 50 mg po x's 1   will continue to monitor    2315  pt states she is feeling better , states her pain is a 4/10 on pain scale  maternal and fetal status reassuring   abdominal pain likely related to myomas   plan of care d/w dr Quinonez [place]   pt to be discharged home   PTL precautions d/w pt  increase fluid intake  instructed on fetal kickcounts   RX: Indocin 25 mg every 6 hours x's 48 hours next dose 0400  pt to follow up with Dr Quinonez as sched on 6/18/2024  w/v discharge instructions given  discharged home     discharged @ 2330

## 2024-06-10 NOTE — OB PROVIDER TRIAGE NOTE - HISTORY OF PRESENT ILLNESS
PNC : Dr Devi Klein  37 y/o  @ 23.1 wks gestation presents with c/o abdominal cramping since 2024 @ 0100 states pain is coming and going and reports her pain is 1/10 on pain scale denies any LOF or VB reports +FM denies any n/v/d denies any fever or chills denies any hematuria or dysuria pt tolerating po fluids and solids ap care comp :  fibroids x's 5  PNC : Dr Devi Klein  37 y/o  @ 23.1 wks gestation presents with c/o abdominal cramping since 2024 @ 0100 states pain is coming and going and reports her pain is 1/10 on pain scale denies any LOF or VB reports +FM denies any n/v/d denies any fever or chills denies any hematuria or dysuria pt tolerating po fluids and solids ap care comp :  fibroids x's 5   anterior subserosal 5.9x6.31x5.84  posterior subserosal 4.18x4.57x4.3  fundal 10.74x10.1x9.24  fundal pedunculated  6.97x6.07x7.41  anterior subserosal 2.91x2.82x1.56   PNC : Dr Devi Klein  37 y/o  @ 23.1 wks gestation presents with c/o abdominal cramping since 2024 @ 0100 states pain is coming and going and reports her pain is 7/10 on pain scale denies any LOF or VB reports +FM denies any n/v/d denies any fever or chills denies any hematuria or dysuria pt tolerating po fluids and solids ap care comp :  fibroids x's 5   anterior subserosal 5.9x6.31x5.84  posterior subserosal 4.18x4.57x4.3  fundal 10.74x10.1x9.24  fundal pedunculated  6.97x6.07x7.41  anterior subserosal 2.91x2.82x1.56

## 2024-06-10 NOTE — OB RN TRIAGE NOTE - NSMATERNALFETALCONCERNS_OBGYN_ALL_OB_FT
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  cardiology  evaluation is recommended prior to hospital discharge or within the first week, sooner if there is a clinical concern. Plan for advanced cardiac imaging in the first month. Pediatric cardiothoracic surgical consult was recommended and requested. See echo for FULL report. Sylwia Becerra RN.

## 2024-06-10 NOTE — OB PROVIDER TRIAGE NOTE - NSHPLABSRESULTS_GEN_ALL_CORE
urinalysis   Urinalysis Basic - ( 10 Basim 2024 20:09 )    Color: Yellow / Appearance: Clear / S.010 / pH: x  Gluc: x / Ketone: Negative mg/dL  / Bili: Negative / Urobili: 0.2 mg/dL   Blood: x / Protein: Negative mg/dL / Nitrite: Negative   Leuk Esterase: Negative / RBC: x / WBC x   Sq Epi: x / Non Sq Epi: x / Bacteria: x

## 2024-06-10 NOTE — ED ADULT TRIAGE NOTE - CHIEF COMPLAINT QUOTE
Patient c/o abdominal cramping x 2 days, reports 23 weeks pregnant, ALEXIS 10/6, , OB [facility]. Baby moving normally. Denies vaginal bleeding, nausea, vomiting. Denies phx

## 2024-06-10 NOTE — OB PROVIDER TRIAGE NOTE - ADDITIONAL INSTRUCTIONS
pt states she is feeling better , states her pain is a 4/10 on pain scale  maternal and fetal status reassuring   abdominal pain likely related to myomas   plan of care d/w dr Devi Klein   pt to be discharged home   PTL precautions d/w pt  increase fluid intake  instructed on fetal kickcounts   RX: Indocin 25 mg every 6 hours x's 48 hours next dose 0400  w/v discharge instructions given  discharged home     discharged @ 2330

## 2024-06-11 LAB
CULTURE RESULTS: SIGNIFICANT CHANGE UP
SPECIMEN SOURCE: SIGNIFICANT CHANGE UP

## 2024-06-11 RX ORDER — INDOMETHACIN 50 MG
1 CAPSULE ORAL
Qty: 8 | Refills: 0
Start: 2024-06-11 | End: 2024-06-12

## 2024-06-12 DIAGNOSIS — O47.02 FALSE LABOR BEFORE 37 COMPLETED WEEKS OF GESTATION, SECOND TRIMESTER: ICD-10-CM

## 2024-06-12 DIAGNOSIS — O09.292 SUPERVISION OF PREGNANCY WITH OTHER POOR REPRODUCTIVE OR OBSTETRIC HISTORY, SECOND TRIMESTER: ICD-10-CM

## 2024-06-12 DIAGNOSIS — O09.522 SUPERVISION OF ELDERLY MULTIGRAVIDA, SECOND TRIMESTER: ICD-10-CM

## 2024-06-12 DIAGNOSIS — O34.12 MATERNAL CARE FOR BENIGN TUMOR OF CORPUS UTERI, SECOND TRIMESTER: ICD-10-CM

## 2024-06-12 DIAGNOSIS — O35.BXX0 MATERNAL CARE FOR OTHER (SUSPECTED) FETAL ABNORMALITY AND DAMAGE, FETAL CARDIAC ANOMALIES, NOT APPLICABLE OR UNSPECIFIED: ICD-10-CM

## 2024-06-12 DIAGNOSIS — D25.2 SUBSEROSAL LEIOMYOMA OF UTERUS: ICD-10-CM

## 2024-06-12 DIAGNOSIS — Z3A.23 23 WEEKS GESTATION OF PREGNANCY: ICD-10-CM

## 2024-06-18 ENCOUNTER — APPOINTMENT (OUTPATIENT)
Dept: OBGYN | Facility: CLINIC | Age: 39
End: 2024-06-18
Payer: COMMERCIAL

## 2024-06-18 VITALS
HEIGHT: 65 IN | SYSTOLIC BLOOD PRESSURE: 113 MMHG | DIASTOLIC BLOOD PRESSURE: 79 MMHG | BODY MASS INDEX: 34.16 KG/M2 | WEIGHT: 205 LBS

## 2024-06-18 DIAGNOSIS — Z34.02 ENCOUNTER FOR SUPERVISION OF NORMAL FIRST PREGNANCY, SECOND TRIMESTER: ICD-10-CM

## 2024-06-18 PROBLEM — D21.9 BENIGN NEOPLASM OF CONNECTIVE AND OTHER SOFT TISSUE, UNSPECIFIED: Chronic | Status: ACTIVE | Noted: 2024-06-10

## 2024-06-18 PROCEDURE — 36415 COLL VENOUS BLD VENIPUNCTURE: CPT

## 2024-06-18 PROCEDURE — 0502F SUBSEQUENT PRENATAL CARE: CPT

## 2024-06-19 ENCOUNTER — TRANSCRIPTION ENCOUNTER (OUTPATIENT)
Age: 39
End: 2024-06-19

## 2024-06-19 DIAGNOSIS — O99.019 ANEMIA COMPLICATING PREGNANCY, UNSPECIFIED TRIMESTER: ICD-10-CM

## 2024-06-19 LAB
GLUCOSE 1H P 50 G GLC PO SERPL-MCNC: 93 MG/DL
HCT VFR BLD CALC: 30.2 %
HGB BLD-MCNC: 10.7 G/DL
MCHC RBC-ENTMCNC: 30.5 PG
MCHC RBC-ENTMCNC: 35.4 GM/DL
MCV RBC AUTO: 86 FL
PLATELET # BLD AUTO: 282 K/UL
RBC # BLD: 3.51 M/UL
RBC # FLD: 13.2 %
WBC # FLD AUTO: 10.14 K/UL

## 2024-06-19 RX ORDER — IRON, FOLIC ACID, CYANOCOBALAMIN, ASCORBIC ACID, AND DOCUSATE SODIUM 90; 1; 12; 120; 50 MG/1; MG/1; UG/1; MG/1; MG/1
90-1 TABLET, FILM COATED ORAL
Qty: 90 | Refills: 3 | Status: ACTIVE | COMMUNITY
Start: 2024-06-19 | End: 1900-01-01

## 2024-07-18 ENCOUNTER — ASOB RESULT (OUTPATIENT)
Age: 39
End: 2024-07-18

## 2024-07-18 ENCOUNTER — NON-APPOINTMENT (OUTPATIENT)
Age: 39
End: 2024-07-18

## 2024-07-18 ENCOUNTER — APPOINTMENT (OUTPATIENT)
Dept: OBGYN | Facility: CLINIC | Age: 39
End: 2024-07-18
Payer: COMMERCIAL

## 2024-07-18 ENCOUNTER — APPOINTMENT (OUTPATIENT)
Dept: ANTEPARTUM | Facility: CLINIC | Age: 39
End: 2024-07-18
Payer: COMMERCIAL

## 2024-07-18 VITALS
WEIGHT: 209 LBS | HEIGHT: 65 IN | SYSTOLIC BLOOD PRESSURE: 112 MMHG | BODY MASS INDEX: 34.82 KG/M2 | DIASTOLIC BLOOD PRESSURE: 74 MMHG

## 2024-07-18 PROCEDURE — 0502F SUBSEQUENT PRENATAL CARE: CPT

## 2024-07-18 PROCEDURE — 76819 FETAL BIOPHYS PROFIL W/O NST: CPT | Mod: 59

## 2024-07-18 PROCEDURE — 76816 OB US FOLLOW-UP PER FETUS: CPT

## 2024-08-01 ENCOUNTER — APPOINTMENT (OUTPATIENT)
Dept: ANTEPARTUM | Facility: CLINIC | Age: 39
End: 2024-08-01

## 2024-08-01 ENCOUNTER — APPOINTMENT (OUTPATIENT)
Dept: OBGYN | Facility: CLINIC | Age: 39
End: 2024-08-01
Payer: COMMERCIAL

## 2024-08-01 VITALS
WEIGHT: 212 LBS | SYSTOLIC BLOOD PRESSURE: 119 MMHG | HEIGHT: 65 IN | DIASTOLIC BLOOD PRESSURE: 77 MMHG | BODY MASS INDEX: 35.32 KG/M2

## 2024-08-01 DIAGNOSIS — N92.6 IRREGULAR MENSTRUATION, UNSPECIFIED: ICD-10-CM

## 2024-08-01 PROCEDURE — 0502F SUBSEQUENT PRENATAL CARE: CPT

## 2024-08-01 PROCEDURE — 76818 FETAL BIOPHYS PROFILE W/NST: CPT

## 2024-08-05 LAB
ESTIMATED AVERAGE GLUCOSE: 82 MG/DL
HBA1C MFR BLD HPLC: 4.5 %

## 2024-08-15 ENCOUNTER — APPOINTMENT (OUTPATIENT)
Dept: OBGYN | Facility: CLINIC | Age: 39
End: 2024-08-15
Payer: COMMERCIAL

## 2024-08-15 ENCOUNTER — APPOINTMENT (OUTPATIENT)
Dept: ANTEPARTUM | Facility: CLINIC | Age: 39
End: 2024-08-15
Payer: COMMERCIAL

## 2024-08-15 ENCOUNTER — ASOB RESULT (OUTPATIENT)
Age: 39
End: 2024-08-15

## 2024-08-15 VITALS — SYSTOLIC BLOOD PRESSURE: 113 MMHG | WEIGHT: 210 LBS | DIASTOLIC BLOOD PRESSURE: 72 MMHG | BODY MASS INDEX: 34.95 KG/M2

## 2024-08-15 PROCEDURE — 76816 OB US FOLLOW-UP PER FETUS: CPT

## 2024-08-15 PROCEDURE — 76818 FETAL BIOPHYS PROFILE W/NST: CPT | Mod: 59

## 2024-08-15 PROCEDURE — 90471 IMMUNIZATION ADMIN: CPT

## 2024-08-15 PROCEDURE — 0502F SUBSEQUENT PRENATAL CARE: CPT

## 2024-08-15 PROCEDURE — 90715 TDAP VACCINE 7 YRS/> IM: CPT

## 2024-08-30 ENCOUNTER — APPOINTMENT (OUTPATIENT)
Dept: OBGYN | Facility: CLINIC | Age: 39
End: 2024-08-30
Payer: COMMERCIAL

## 2024-08-30 VITALS
SYSTOLIC BLOOD PRESSURE: 121 MMHG | DIASTOLIC BLOOD PRESSURE: 76 MMHG | WEIGHT: 214 LBS | BODY MASS INDEX: 35.65 KG/M2 | HEIGHT: 65 IN

## 2024-08-30 PROCEDURE — 0502F SUBSEQUENT PRENATAL CARE: CPT

## 2024-09-12 ENCOUNTER — APPOINTMENT (OUTPATIENT)
Dept: CARDIOTHORACIC SURGERY | Facility: CLINIC | Age: 39
End: 2024-09-12

## 2024-09-12 DIAGNOSIS — O35.BXX0 MATERNAL CARE FOR OTHER (SUSPECTED) FETAL ABNORMALITY AND DAMAGE, FETAL CARDIAC ANOMALIES, NOT APPLICABLE OR UNSPECIFIED: ICD-10-CM

## 2024-09-12 PROCEDURE — 99203 OFFICE O/P NEW LOW 30 MIN: CPT

## 2024-09-13 ENCOUNTER — ASOB RESULT (OUTPATIENT)
Age: 39
End: 2024-09-13

## 2024-09-13 ENCOUNTER — APPOINTMENT (OUTPATIENT)
Dept: OBGYN | Facility: CLINIC | Age: 39
End: 2024-09-13
Payer: COMMERCIAL

## 2024-09-13 ENCOUNTER — APPOINTMENT (OUTPATIENT)
Dept: ANTEPARTUM | Facility: CLINIC | Age: 39
End: 2024-09-13

## 2024-09-13 VITALS
DIASTOLIC BLOOD PRESSURE: 74 MMHG | SYSTOLIC BLOOD PRESSURE: 114 MMHG | WEIGHT: 212 LBS | HEIGHT: 65 IN | BODY MASS INDEX: 35.32 KG/M2

## 2024-09-13 DIAGNOSIS — N92.6 IRREGULAR MENSTRUATION, UNSPECIFIED: ICD-10-CM

## 2024-09-13 PROCEDURE — 76818 FETAL BIOPHYS PROFILE W/NST: CPT | Mod: 59

## 2024-09-13 PROCEDURE — 76816 OB US FOLLOW-UP PER FETUS: CPT

## 2024-09-13 PROCEDURE — 36415 COLL VENOUS BLD VENIPUNCTURE: CPT | Mod: NC

## 2024-09-13 PROCEDURE — 59025 FETAL NON-STRESS TEST: CPT | Mod: NC

## 2024-09-13 PROCEDURE — 0502F SUBSEQUENT PRENATAL CARE: CPT

## 2024-09-15 LAB
GP B STREP DNA SPEC QL NAA+PROBE: NOT DETECTED
HCT VFR BLD CALC: 32.4 %
HGB BLD-MCNC: 11.2 G/DL
HIV1+2 AB SPEC QL IA.RAPID: NONREACTIVE
MCHC RBC-ENTMCNC: 30.7 PG
MCHC RBC-ENTMCNC: 34.6 GM/DL
MCV RBC AUTO: 88.8 FL
PLATELET # BLD AUTO: 240 K/UL
RBC # BLD: 3.65 M/UL
RBC # FLD: 13.4 %
SOURCE GBS: NORMAL
WBC # FLD AUTO: 11.15 K/UL

## 2024-09-15 NOTE — ASSESSMENT
[FreeTextEntry1] : I reviewed the fetal echo findings with the patient and gisella several diagrams detailing the different types of vascular rings and arch branching pattern variants.  We discussed the need for  imaging including CT scan if the  echo confirms the presence of a ring or is indeterminate.  We also discussed the indications for and timing of repair as well as the role played by expectant management.  The patient demonstrated good understanding of all of the factors affecting surgical decision making.

## 2024-09-15 NOTE — DATA REVIEWED
[FreeTextEntry1] : I independently reviewed the following studies:  Echocardiogram (5/23/24):  R aortic arch with ?aberrant LSCA and left sided ductus

## 2024-09-20 ENCOUNTER — APPOINTMENT (OUTPATIENT)
Dept: OBGYN | Facility: CLINIC | Age: 39
End: 2024-09-20
Payer: COMMERCIAL

## 2024-09-20 ENCOUNTER — ASOB RESULT (OUTPATIENT)
Age: 39
End: 2024-09-20

## 2024-09-20 ENCOUNTER — APPOINTMENT (OUTPATIENT)
Dept: ANTEPARTUM | Facility: CLINIC | Age: 39
End: 2024-09-20

## 2024-09-20 VITALS
HEIGHT: 65 IN | BODY MASS INDEX: 35.99 KG/M2 | WEIGHT: 216 LBS | SYSTOLIC BLOOD PRESSURE: 118 MMHG | DIASTOLIC BLOOD PRESSURE: 78 MMHG

## 2024-09-20 PROCEDURE — 0502F SUBSEQUENT PRENATAL CARE: CPT

## 2024-09-20 PROCEDURE — 76818 FETAL BIOPHYS PROFILE W/NST: CPT

## 2024-09-24 ENCOUNTER — TRANSCRIPTION ENCOUNTER (OUTPATIENT)
Age: 39
End: 2024-09-24

## 2024-09-26 ENCOUNTER — NON-APPOINTMENT (OUTPATIENT)
Age: 39
End: 2024-09-26

## 2024-09-27 ENCOUNTER — ASOB RESULT (OUTPATIENT)
Age: 39
End: 2024-09-27

## 2024-09-27 ENCOUNTER — APPOINTMENT (OUTPATIENT)
Dept: OBGYN | Facility: CLINIC | Age: 39
End: 2024-09-27
Payer: COMMERCIAL

## 2024-09-27 ENCOUNTER — APPOINTMENT (OUTPATIENT)
Dept: ANTEPARTUM | Facility: CLINIC | Age: 39
End: 2024-09-27
Payer: COMMERCIAL

## 2024-09-27 VITALS
BODY MASS INDEX: 36.32 KG/M2 | HEIGHT: 65 IN | SYSTOLIC BLOOD PRESSURE: 118 MMHG | WEIGHT: 218 LBS | DIASTOLIC BLOOD PRESSURE: 75 MMHG

## 2024-09-27 PROCEDURE — 0502F SUBSEQUENT PRENATAL CARE: CPT

## 2024-09-27 PROCEDURE — 76819 FETAL BIOPHYS PROFIL W/O NST: CPT | Mod: 59

## 2024-09-27 PROCEDURE — 76816 OB US FOLLOW-UP PER FETUS: CPT

## 2024-09-30 ENCOUNTER — APPOINTMENT (OUTPATIENT)
Dept: OBGYN | Facility: HOSPITAL | Age: 39
End: 2024-09-30

## 2024-10-01 ENCOUNTER — TRANSCRIPTION ENCOUNTER (OUTPATIENT)
Age: 39
End: 2024-10-01

## 2024-10-01 ENCOUNTER — INPATIENT (INPATIENT)
Facility: HOSPITAL | Age: 39
LOS: 1 days | Discharge: HOME | End: 2024-10-03
Attending: OBSTETRICS & GYNECOLOGY | Admitting: OBSTETRICS & GYNECOLOGY
Payer: COMMERCIAL

## 2024-10-01 VITALS
WEIGHT: 214.95 LBS | RESPIRATION RATE: 18 BRPM | TEMPERATURE: 98 F | DIASTOLIC BLOOD PRESSURE: 75 MMHG | HEIGHT: 64 IN | SYSTOLIC BLOOD PRESSURE: 131 MMHG | HEART RATE: 83 BPM

## 2024-10-01 DIAGNOSIS — O09.219 SUPERVISION OF PREGNANCY WITH HISTORY OF PRE-TERM LABOR, UNSPECIFIED TRIMESTER: ICD-10-CM

## 2024-10-01 DIAGNOSIS — Z34.90 ENCOUNTER FOR SUPERVISION OF NORMAL PREGNANCY, UNSPECIFIED, UNSPECIFIED TRIMESTER: ICD-10-CM

## 2024-10-01 LAB
BASOPHILS # BLD AUTO: 0.05 K/UL — SIGNIFICANT CHANGE UP (ref 0–0.2)
BASOPHILS NFR BLD AUTO: 0.5 % — SIGNIFICANT CHANGE UP (ref 0–2)
BLD GP AB SCN SERPL QL: POSITIVE — SIGNIFICANT CHANGE UP
EOSINOPHIL # BLD AUTO: 0.1 K/UL — SIGNIFICANT CHANGE UP (ref 0–0.5)
EOSINOPHIL NFR BLD AUTO: 0.9 % — SIGNIFICANT CHANGE UP (ref 0–6)
HCT VFR BLD CALC: 29.3 % — LOW (ref 34.5–45)
HGB BLD-MCNC: 10.7 G/DL — LOW (ref 11.5–15.5)
IANC: 7.83 K/UL — HIGH (ref 1.8–7.4)
IMM GRANULOCYTES NFR BLD AUTO: 0.6 % — SIGNIFICANT CHANGE UP (ref 0–0.9)
LYMPHOCYTES # BLD AUTO: 1.98 K/UL — SIGNIFICANT CHANGE UP (ref 1–3.3)
LYMPHOCYTES # BLD AUTO: 18.2 % — SIGNIFICANT CHANGE UP (ref 13–44)
MCHC RBC-ENTMCNC: 31.1 PG — SIGNIFICANT CHANGE UP (ref 27–34)
MCHC RBC-ENTMCNC: 36.5 GM/DL — HIGH (ref 32–36)
MCV RBC AUTO: 85.2 FL — SIGNIFICANT CHANGE UP (ref 80–100)
MONOCYTES # BLD AUTO: 0.82 K/UL — SIGNIFICANT CHANGE UP (ref 0–0.9)
MONOCYTES NFR BLD AUTO: 7.6 % — SIGNIFICANT CHANGE UP (ref 2–14)
NEUTROPHILS # BLD AUTO: 7.83 K/UL — HIGH (ref 1.8–7.4)
NEUTROPHILS NFR BLD AUTO: 72.2 % — SIGNIFICANT CHANGE UP (ref 43–77)
NRBC # BLD: 0 /100 WBCS — SIGNIFICANT CHANGE UP (ref 0–0)
NRBC # FLD: 0 K/UL — SIGNIFICANT CHANGE UP (ref 0–0)
PLATELET # BLD AUTO: 208 K/UL — SIGNIFICANT CHANGE UP (ref 150–400)
RBC # BLD: 3.44 M/UL — LOW (ref 3.8–5.2)
RBC # FLD: 12.8 % — SIGNIFICANT CHANGE UP (ref 10.3–14.5)
RH IG SCN BLD-IMP: POSITIVE — SIGNIFICANT CHANGE UP
RH IG SCN BLD-IMP: POSITIVE — SIGNIFICANT CHANGE UP
T PALLIDUM AB TITR SER: NEGATIVE — SIGNIFICANT CHANGE UP
WBC # BLD: 10.85 K/UL — HIGH (ref 3.8–10.5)
WBC # FLD AUTO: 10.85 K/UL — HIGH (ref 3.8–10.5)

## 2024-10-01 PROCEDURE — 59400 OBSTETRICAL CARE: CPT

## 2024-10-01 RX ORDER — MAGNESIUM HYDROXIDE 400 MG/5ML
30 SUSPENSION, ORAL (FINAL DOSE FORM) ORAL
Refills: 0 | Status: DISCONTINUED | OUTPATIENT
Start: 2024-10-01 | End: 2024-10-03

## 2024-10-01 RX ORDER — ANTI-ITCH CREAM 1 G/100G
1 OINTMENT TOPICAL EVERY 6 HOURS
Refills: 0 | Status: DISCONTINUED | OUTPATIENT
Start: 2024-10-01 | End: 2024-10-03

## 2024-10-01 RX ORDER — SODIUM CHLORIDE IRRIG SOLUTION 0.9 %
1000 SOLUTION, IRRIGATION IRRIGATION
Refills: 0 | Status: DISCONTINUED | OUTPATIENT
Start: 2024-10-01 | End: 2024-10-02

## 2024-10-01 RX ORDER — PRENATAL VIT,CAL 76/IRON/FOLIC 29 MG-1 MG
1 TABLET ORAL DAILY
Refills: 0 | Status: DISCONTINUED | OUTPATIENT
Start: 2024-10-01 | End: 2024-10-03

## 2024-10-01 RX ORDER — INFLUENZA VIRUS VACCINE 15; 15; 15; 15 UG/.5ML; UG/.5ML; UG/.5ML; UG/.5ML
0.5 SUSPENSION INTRAMUSCULAR ONCE
Refills: 0 | Status: DISCONTINUED | OUTPATIENT
Start: 2024-10-01 | End: 2024-10-03

## 2024-10-01 RX ORDER — TETANUS TOXOID, REDUCED DIPHTHERIA TOXOID AND ACELLULAR PERTUSSIS VACCINE, ADSORBED 5; 2.5; 8; 8; 2.5 [IU]/.5ML; [IU]/.5ML; UG/.5ML; UG/.5ML; UG/.5ML
0.5 SUSPENSION INTRAMUSCULAR ONCE
Refills: 0 | Status: DISCONTINUED | OUTPATIENT
Start: 2024-10-01 | End: 2024-10-03

## 2024-10-01 RX ORDER — PRAMOXINE HYDROCHLORIDE 10 MG/ML
1 LOTION TOPICAL EVERY 4 HOURS
Refills: 0 | Status: DISCONTINUED | OUTPATIENT
Start: 2024-10-01 | End: 2024-10-03

## 2024-10-01 RX ORDER — OXYTOCIN/RINGER'S LACTATE 20/500ML
167 PLASTIC BAG, INJECTION (ML) INTRAVENOUS
Qty: 30 | Refills: 0 | Status: DISCONTINUED | OUTPATIENT
Start: 2024-10-01 | End: 2024-10-03

## 2024-10-01 RX ORDER — KETOROLAC TROMETHAMINE 10 MG/1
30 TABLET, FILM COATED ORAL ONCE
Refills: 0 | Status: DISCONTINUED | OUTPATIENT
Start: 2024-10-01 | End: 2024-10-01

## 2024-10-01 RX ORDER — DIPHENHYDRAMINE HCL 12.5MG/5ML
25 LIQUID (ML) ORAL EVERY 6 HOURS
Refills: 0 | Status: DISCONTINUED | OUTPATIENT
Start: 2024-10-01 | End: 2024-10-03

## 2024-10-01 RX ORDER — DIBUCAINE 1 %
1 OINTMENT (GRAM) TOPICAL EVERY 6 HOURS
Refills: 0 | Status: DISCONTINUED | OUTPATIENT
Start: 2024-10-01 | End: 2024-10-03

## 2024-10-01 RX ORDER — METHYLERGONOVINE 0.2 MG/1
0.2 TABLET ORAL ONCE
Refills: 0 | Status: COMPLETED | OUTPATIENT
Start: 2024-10-01 | End: 2024-10-01

## 2024-10-01 RX ORDER — OXYTOCIN/RINGER'S LACTATE 20/500ML
PLASTIC BAG, INJECTION (ML) INTRAVENOUS
Qty: 30 | Refills: 0 | Status: DISCONTINUED | OUTPATIENT
Start: 2024-10-01 | End: 2024-10-02

## 2024-10-01 RX ORDER — SODIUM CITRATE AND CITRIC ACID MONOHYDRATE 334; 500 MG/5ML; MG/5ML
15 SOLUTION ORAL EVERY 6 HOURS
Refills: 0 | Status: DISCONTINUED | OUTPATIENT
Start: 2024-10-01 | End: 2024-10-02

## 2024-10-01 RX ORDER — SOAP/LANOLIN
1 BAR TOPICAL EVERY 4 HOURS
Refills: 0 | Status: DISCONTINUED | OUTPATIENT
Start: 2024-10-01 | End: 2024-10-03

## 2024-10-01 RX ORDER — SODIUM CHLORIDE 0.9 % (FLUSH) 0.9 %
3 SYRINGE (ML) INJECTION EVERY 8 HOURS
Refills: 0 | Status: DISCONTINUED | OUTPATIENT
Start: 2024-10-01 | End: 2024-10-03

## 2024-10-01 RX ORDER — ACETAMINOPHEN 325 MG
975 TABLET ORAL
Refills: 0 | Status: DISCONTINUED | OUTPATIENT
Start: 2024-10-01 | End: 2024-10-03

## 2024-10-01 RX ORDER — CARBOPROST TROMETHAMINE 250 UG/ML
250 INJECTION, SOLUTION INTRAMUSCULAR ONCE
Refills: 0 | Status: DISCONTINUED | OUTPATIENT
Start: 2024-10-01 | End: 2024-10-03

## 2024-10-01 RX ORDER — ONDANSETRON HCL/PF 4 MG/2 ML
4 VIAL (ML) INJECTION ONCE
Refills: 0 | Status: COMPLETED | OUTPATIENT
Start: 2024-10-01 | End: 2024-10-01

## 2024-10-01 RX ORDER — CHLORHEXIDINE GLUCONATE ORAL RINSE 1.2 MG/ML
1 SOLUTION DENTAL DAILY
Refills: 0 | Status: DISCONTINUED | OUTPATIENT
Start: 2024-10-01 | End: 2024-10-02

## 2024-10-01 RX ORDER — OXYCODONE HYDROCHLORIDE 30 MG/1
5 TABLET, FILM COATED, EXTENDED RELEASE ORAL
Refills: 0 | Status: DISCONTINUED | OUTPATIENT
Start: 2024-10-01 | End: 2024-10-03

## 2024-10-01 RX ORDER — OXYCODONE HYDROCHLORIDE 30 MG/1
5 TABLET, FILM COATED, EXTENDED RELEASE ORAL ONCE
Refills: 0 | Status: DISCONTINUED | OUTPATIENT
Start: 2024-10-01 | End: 2024-10-03

## 2024-10-01 RX ORDER — OXYTOCIN/RINGER'S LACTATE 20/500ML
167 PLASTIC BAG, INJECTION (ML) INTRAVENOUS
Qty: 30 | Refills: 0 | Status: COMPLETED | OUTPATIENT
Start: 2024-10-01 | End: 2024-10-01

## 2024-10-01 RX ADMIN — KETOROLAC TROMETHAMINE 30 MILLIGRAM(S): 10 TABLET, FILM COATED ORAL at 23:48

## 2024-10-01 RX ADMIN — Medication 167 MILLIUNIT(S)/MIN: at 22:00

## 2024-10-01 RX ADMIN — Medication 4 MILLIGRAM(S): at 22:15

## 2024-10-01 RX ADMIN — Medication 2 MILLIUNIT(S)/MIN: at 09:49

## 2024-10-01 RX ADMIN — METHYLERGONOVINE 0.2 MILLIGRAM(S): 0.2 TABLET ORAL at 21:33

## 2024-10-01 NOTE — OB PROVIDER H&P - NSLOWPPHRISK_OBGYN_A_OB
No previous uterine incision/[place] Pregnancy/Less than or equal to 4 previous vaginal births/No known bleeding disorder/No history of postpartum hemorrhage/No other PPH risks indicated

## 2024-10-01 NOTE — OB PROVIDER LABOR PROGRESS NOTE - ASSESSMENT
39y  at 39+2wks IOL for AMA now with CB in place    Plan  -will change to pitocin 2/2 cat 2 tracing  -EFM/TOCO/IV fluids  -ambulation/repositioning  -epidural    Dr Portillo updated  Natali AMBROSIOM
A/P: 40y/o  @39w2d IOL for AMA  - Labor: spCB/BC, pit@10a, AROM@4p  - Fetus: cat 1  - GBS: neg  - Pain: epidural, well controlled.    AROM completed, clear fluid. Patient more dilated, same effacement. Continue pitocin.     Estela Lowe, PGY2  d/w Dr. Portillo    
Continue pitocin, currently at 12mu   CB noted to be within the vaginal vault- deflated and removed   Fetal head well applied. Discussed AROM   Pt desires epidural prior - anesthesia notified     DW: Dr. Lindsey Quan, PGY-4

## 2024-10-01 NOTE — OB PROVIDER H&P - NSMATERNALFETALCONCERNS_OBGYN_ALL_OB_FT
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 No further fetal cardiology follow up is indicated unless there is a clinical change.  cardiology  evaluation is recommended prior to hospital discharge or within the first week, sooner if there is a clinical concern. Plan for advanced cardiac imaging in the first month. Pediatric cardiothoracic surgical consult was recommended and requested. See echo for FULL report. Sylwia Becerra RN.  AMA , UTERINE FIBROIDS Tati Oleary RN 2024

## 2024-10-01 NOTE — OB NEONATOLOGY/PEDIATRICIAN DELIVERY SUMMARY - NSPEDSNEONOTESA_OBGYN_ALL_OB_FT
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 Baby is a 39.2 week  male born via   to a 38 y/o [id number]  mother. 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 Maternal labs include blood type O+, HIV Ag/Ab nonreactive, RPR nonreactive, rubella immune, HBsAg negative, GBS - on . ROM at 1601 on 10/01 with clear fluids (ROM hours: 6H).  Baby emerged vigorous, crying, was w/d/s/s with APGARS of 9/9 .   Mom plans to initiate breastfeeding consents Hep B vaccine and consents circ.  Highest maternal temp: 37.0. EOS 0.04.    : 10/01/24  TOB: 212

## 2024-10-01 NOTE — OB PROVIDER H&P - CLICK TO LAUNCH ORM
[facility] Transitions Follow Up Call    2020    Patient: Sally Heath  Patient :    MRN: [id number]  Reason for Admission: V-tach  Discharge Date: 10/22/20 RARS: Readmission Risk Score: 23         Spoke with: Taft Boeck (patient)      Needs to be reviewed by the provider   Additional needs identified to be addressed with provider No  none  Discussed COVID-19 related testing which was not done at this time. Test results were not done. Patient informed of results, if available? Not done         Method of communication with provider : none    Care Transition Nurse (CTN) contacted the patient by telephone to follow up after admission on 10/17/2020. Verified name and  with patient as identifiers. Patients top risk factors for readmission: medical condition    Plan for follow-up call in 7-10 days based on severity of symptoms and risk factors. Plan for next call: symptom management-SOB & Chest pain  CTN provided contact information for future needs. Care Transitions Subsequent and Final Call    Subsequent and Final Calls  Do you have any ongoing symptoms?:  No  Have your medications changed?:  No  Do you have any questions related to your medications?:  No  Do you currently have any active services?:  No  Do you have any needs or concerns that I can assist you with?:  No  Identified Barriers:  None  Care Transitions Interventions  Other Interventions:          Spoke with Bee Fu. He states \"I feel like I am getting a little bit better each day\". Bee Fu states his weight today = 197.2. He does not remember what it was yesterday. He denies any SOB, chest pain, or edema. Bee Fu places himself in the \"green\" heart failure zone today. He states he has not checked his BG level yet today - he states he usually does that at bed time.     Follow Up  Future Appointments   Date Time Provider Jamal Mendoza   2020  2:00 PM ECHO ROOM 1 [facility]   2020  3:15 PM Jose Manuel Arce MD FF Cardio [facility]   12/8/2020  3:15 PM Joseph Nagel DEVICE CHECK FF Cardio [facility]   12/8/2020  3:15 PM Mikey Tomlinson MD FF Cardio [facility]   1/27/2021  3:30 PM SCHEDULE, [place] REMOTE TRANSMISSION FF Cardio [facility]       Maria Antonia Augustin RN .

## 2024-10-01 NOTE — DISCHARGE NOTE OB - CARE PROVIDER_API CALL
Sample-Evelyn Klein  Obstetrics and Gynecology  [address], [address]  [place] [address]  Phone: [phone]  Fax: [phone]  Follow Up Time:

## 2024-10-01 NOTE — DISCHARGE NOTE OB - NS MD DC FALL RISK RISK
For information on Fall & Injury Prevention, visit: [email or web address] OR  [email or web address] OR  https://www.cdc.gov/steadi/patient.html

## 2024-10-01 NOTE — DISCHARGE NOTE OB - MEDICATION SUMMARY - MEDICATIONS TO TAKE
I will START or STAY ON the medications listed below when I get home from the hospital:    ibuprofen 600 mg oral tablet  -- 1 tab(s) by mouth every 6 hours  -- Indication: For Pain    acetaminophen 325 mg oral tablet  -- 3 tab(s) by mouth every 6 hours  -- Indication: For Pain    PNV Prenatal oral tablet  -- 1 tab(s) orally  -- Indication: For Postpartum

## 2024-10-01 NOTE — OB POSTPARTUM EVENT NOTE - NS_EVENTSUMMARY1_OBGYN_ALL_OB_FT
orthostatic bp's performed with RN at bedside, pt noted to have drop in BP during position change from laying to sitting. Bleeding light, fundus firm and at umbilicus. ambulatory

## 2024-10-01 NOTE — OB PROVIDER LABOR PROGRESS NOTE - NS_SUBJECTIVE/OBJECTIVE_OBGYN_ALL_OB_FT
OB Labor Note    S: Patient seen and examined at bedside.     T(C): 36.7 (10-01-24 @ 12:01), Max: 36.8 (10-01-24 @ 05:59)  HR: 71 (10-01-24 @ 16:04) (71 - 96)  BP: 107/66 (10-01-24 @ 16:04) (103/60 - 141/79)  BP(mean): --  ABP: --  ABP(mean): --  RR: 16 (10-01-24 @ 10:24) (16 - 18)  SpO2: 99% (10-01-24 @ 16:01) (91% - 100%)  Wt(kg): --  CVP(mm Hg): --  CI: --  CAPILLARY BLOOD GLUCOSE       N/A      09-30 @ 07:01  -  10-01 @ 07:00  --------------------------------------------------------  IN:    Lactated Ringers: 750 mL  Total IN: 750 mL    OUT:    Voided (mL): 250 mL  Total OUT: 250 mL    Total NET: 500 mL
Pt seen & examined at bedside for labor progress and CB placement. R/B/A discussed.     Vital Signs Last 24 Hrs  T(C): 36.8 (01 Oct 2024 05:59), Max: 36.8 (01 Oct 2024 05:59)  T(F): 98.24 (01 Oct 2024 05:59), Max: 98.24 (01 Oct 2024 05:59)  HR: 85 (01 Oct 2024 08:18) (71 - 93)  BP: 126/81 (01 Oct 2024 07:58) (111/63 - 141/79)  BP(mean): --  RR: 18 (01 Oct 2024 05:59) (18 - 18)  SpO2: 99% (01 Oct 2024 08:18) (91% - 100%)    Parameters below as of 01 Oct 2024 01:39  Patient On (Oxygen Delivery Method): room air          FHR  140, moderate variability, +accels, +intermittent late decels, cat 2  TOCO: Q2-5min irregular  VE: 1/50/-3, posterior, medium, intact  CB Placed in usual fashion. Pt tolerated procedure well.
Pt seen for evaluation of the cervical balloon

## 2024-10-01 NOTE — OB RN DELIVERY SUMMARY - NS_SEPSISRSKCALC_OBGYN_ALL_OB_FT
EOS calculated successfully. EOS Risk Factor: 0.5/1000 live births ([facility] national incidence); GA=39w2d; Temp=98.6; ROM=5.383; GBS='Negative'; Antibiotics='No antibiotics or any antibiotics < 2 hrs prior to birth'

## 2024-10-01 NOTE — CHART NOTE - NSCHARTNOTEFT_GEN_A_CORE
OB Attending Progress Note    Patient seen and evaluated at bedside.  Denies complaints.  Comfortable w/ epidural.      T(C): 36.5 (10-01-24 @ 17:00), Max: 36.8 (10-01-24 @ 05:59)  HR: 74 (10-01-24 @ 18:26) (68 - 106)  BP: 127/81 (10-01-24 @ 18:25) (101/60 - 141/79)  RR: 18 (10-01-24 @ 17:00) (16 - 18)  SpO2: 100% (10-01-24 @ 18:21) (84% - 100%)    SVE: 7/90/2 asynclitic    EFM: cat II, occasional early and late decels  [place]:  contractions q2-3 mins     A/P 39y P0 admitted for IOL AMA      -Labor: asynclitic, repositioned in high fowlers with peanut ball, continue pitocin   -Fetal Status: overall reassuring  -GBS: negative  -Analgesia: epidural in place    RAMY Mondragon MD
Tracing note      ,  moderate variability, +accels, -decels, cat 1  TOO: q5-6min lasting 90 seconds     plan  as ctx pattern is irregular, will start pitocin despite ctxs lasting 90 seconds    Discussed w/ Dr Lindsey Green CNM
OB Attending Progress Note    Patient seen and evaluated at bedside.  Denies complaints.  Feeling some pressure    T(C): 36.6 (10-01-24 @ 18:59), Max: 36.8 (10-01-24 @ 05:59)  HR: 91 (10-01-24 @ 20:42) (68 - 114)  BP: 132/75 (10-01-24 @ 20:40) (101/60 - 141/79)  RR: 17 (10-01-24 @ 18:59) (16 - 18)  SpO2: 93% (10-01-24 @ 20:42) (81% - 100%)    SVE: 10/100/+1    EFM:  cat II, intermittent variable decels  [place]:  contractions q2-4 mins    A/P 39y P0 admitted for IOL AMA      -Labor: fully dilated, will start pushing  -Fetal Status: overall reassurring  -GBS: negative  -Analgesia: epidural in place    RAMY Mondragon MD

## 2024-10-01 NOTE — OB PROVIDER H&P - NS_PREOPBLOODCONS_OBGYN_ALL_OB
Not well controlled  Increase venlafaxine (Effexor) 37 5mg to 75mg PO daily  Discussed side effects  Continue to monitor and follow up in 1 month  No

## 2024-10-01 NOTE — DISCHARGE NOTE OB - PATIENT PORTAL LINK FT
You can access the FollowMyHealth Patient Portal offered by [facility] by registering at the following website: [email or web address]. By joining [facility]’s FollowMyHealth portal, you will also be able to view your health information using other applications (apps) compatible with our system.

## 2024-10-01 NOTE — OB PROVIDER DELIVERY SUMMARY - NSPROVIDERDELIVERYNOTE_OBGYN_ALL_OB_FT
Delivery of liveborn male infant from OA position  Head, shoulders and body delivered easily  Placenta delivered spontaneously and intact   Fibroid uterus - JERICHO atony noted - treated with intrauterine bimanual massage, IM methergine, rectal cytotec and TXA  2nd degree laceration repaired with chromic suture, adequate hemostasis noted    Baby to NICU for cardiac evaluation

## 2024-10-01 NOTE — OB RN DELIVERY SUMMARY - NS_GESTAGEATBIRTHA_OBGYN_ALL_OB_FT
12/05/23 1044   Patient Assessment/Suction   Level of Consciousness (AVPU) alert   Respiratory Effort Unlabored   Expansion/Accessory Muscles/Retractions no use of accessory muscles   Rhythm/Pattern, Respiratory unlabored;depth regular;pattern regular;no shortness of breath reported   PRE-TX-O2   Device (Oxygen Therapy) room air   SpO2 96 %   Pulse Oximetry Type Intermittent   $ Pulse Oximetry - Multiple Charge Pulse Oximetry - Multiple   Pulse 87   Resp 16   Incentive Spirometer   $ Incentive Spirometer Charges done with encouragement   Incentive Spirometer Predicted Level (mL) 2680   Administration (IS) mouthpiece utilized   Number of Repetitions (IS) 10   Level Incentive Spirometer (mL) 2000   Patient Tolerance (IS) good;no adverse signs/symptoms present       
Patient seen and examined. Overnight notes reviewed. Patient reports abdominal pain which is improved with p.r.n. medications.  General surgery consulted.  NPO with plan for OR today with Dr. Rothman.    PE:  NAD.   Heart: RRR  Abd: DERRICK TTP.       Plan:  General surgery consulted   NPO with plan for OR today  P.r.n. pain medications     
39w2d

## 2024-10-01 NOTE — OB PROVIDER H&P - HISTORY OF PRESENT ILLNESS
HPI: Pt is a 38yo  @39.2wks, ALEXIS: 10/6/24, who presents as a scheduled induction of labor secondary to AMA. Patient denies any complaints at this time. Endorses positive FM. Denies LOF/VB/CTX.  PNC significant for fetal alert: Right aortic arch with left posterior ductus arteriosus (vascular ring), myomas, AMA    Myomas:  Site                 L(cm)     W(cm)    D(cm)   Location  Posterior          10.53     8.82      7.58      Intramural  Anterior            5.57      4.53      3.78      Intramural  Anterior JERICHO      4.57      5.02      4.1       Intramural    GBS neg  EFW: 3400g  OB: Sample

## 2024-10-01 NOTE — OB RN PATIENT PROFILE - NSMATERNALFETALCONCERNS_OBGYN_ALL_OB_FT
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  cardiology  evaluation is recommended prior to hospital discharge or within the first week, sooner if there is a clinical concern. Plan for advanced cardiac imaging in the first month. Pediatric cardiothoracic surgical consult was recommended and requested. See echo for FULL report. Sylwia Becerra RN.  AMA , UTERINE FIBROIDS Tati Oleary RN 2024

## 2024-10-01 NOTE — OB PROVIDER DELIVERY SUMMARY - NSSELHIDDEN_OBGYN_ALL_OB_FT
[NS_DeliveryAttending1_OBGYN_ALL_OB_FT:[id number]==],[NS_DeliveryRN_OBGYN_ALL_OB_FT:[email or web address]=]

## 2024-10-01 NOTE — OB PROVIDER H&P - NS_OBGYNHISTORY_OBGYN_ALL_OB_FT
OB Hx:  SAB x1 no D&C    GYN Hx:  Myomas  Site                 L(cm)     W(cm)    D(cm)    Location  Posterior         10.53     8.82      7.58     Intramural  Anterior            5.57      4.53      3.78     Intramural  Anterior JERICHO      4.57      5.02      4.1     Intramural

## 2024-10-01 NOTE — OB PROVIDER H&P - NSHPPHYSICALEXAM_GEN_ALL_CORE
Vital Signs Last 24 Hrs  T(C): --  T(F): --  HR: 93 (01 Oct 2024 02:05) (93 - 93)  BP: 131/75 (01 Oct 2024 02:05) (131/75 - 131/75)  BP(mean): --  RR: --  SpO2: --    Parameters below as of 01 Oct 2024 01:39  Patient On (Oxygen Delivery Method): room air        Physical Exam:  Gen: NAD, AxOx3  CV: RRR  Resp: CTAB  Abd: soft, NT, gravid        SVE: 0.5/50/-3  FHT: Category 1, reactive   [place]: irregular   EFW: 3400g  Sono: Cephalic

## 2024-10-01 NOTE — OB RN DELIVERY SUMMARY - NSSELHIDDEN_OBGYN_ALL_OB_FT
[NS_DeliveryAttending1_OBGYN_ALL_OB_FT:[id number]==],[NS_DeliveryRN_OBGYN_ALL_OB_FT:[email or web address]=]

## 2024-10-01 NOTE — OB PROVIDER H&P - ASSESSMENT
A/P: Pt is a 40yo  @39.2wks, ALEXIS: 10/6/24, who presents as a scheduled induction of labor secondary to AMA.      1. Admit to LND. Routine Labs. IVF  2. IOL with buccal cytotec and cooks cervical balloon   3. Fetus: Cat 1 tracing, Vertex, EFW 3400g. C/w EFM.  4. AMA   **FA: Right aortic arch with left posterior ductus arteriosus (vascular ring). No further fetal cardiology follow up is indicated unless there is a clinical change.  cardiology  evaluation is recommended prior to hospital discharge or within the first week, sooner if there is a clinical concern. Plan for advanced cardiac imaging in the first month.  5. GBS negative 24  6. Pain: IV pain meds/epidural PRN  7. 2u PRBC on hold- Myomas, ASA, AMA  8. Blood transfusion and induction/labor consents obtained. Risks, benefits, alternatives and possible complications have been discussed in detail with the patient in her native language. Pre-admission, admission, and post admission procedures and expectations were discussed in detail. All questions answered, all appropriate hospital consents were signed. Anticipate normal vaginal delivery. Informed Consent was obtained. The following was discussed:     Induction/Augmentation of Labor: Use of medication and/or cook balloon to begin or enhance labor  Obstetrical management including internal fetal/contraction monitoring  Normal vaginal delivery  Possible  Section: (surgical cut in the abdomen in order to deliver the baby)    DWIGHT Mcneil  Discussed with Dr. Byrd

## 2024-10-02 RX ADMIN — Medication 600 MILLIGRAM(S): at 05:33

## 2024-10-02 RX ADMIN — Medication 1 TABLET(S): at 11:27

## 2024-10-02 RX ADMIN — Medication 600 MILLIGRAM(S): at 11:27

## 2024-10-02 RX ADMIN — Medication 600 MILLIGRAM(S): at 12:30

## 2024-10-02 RX ADMIN — Medication 975 MILLIGRAM(S): at 20:58

## 2024-10-02 RX ADMIN — Medication 975 MILLIGRAM(S): at 15:15

## 2024-10-02 RX ADMIN — Medication 600 MILLIGRAM(S): at 06:03

## 2024-10-02 RX ADMIN — Medication 3 MILLILITER(S): at 18:49

## 2024-10-02 RX ADMIN — Medication 975 MILLIGRAM(S): at 02:37

## 2024-10-02 RX ADMIN — Medication 600 MILLIGRAM(S): at 18:20

## 2024-10-02 RX ADMIN — Medication 600 MILLIGRAM(S): at 17:47

## 2024-10-02 RX ADMIN — Medication 975 MILLIGRAM(S): at 21:28

## 2024-10-02 RX ADMIN — Medication 975 MILLIGRAM(S): at 02:07

## 2024-10-02 RX ADMIN — Medication 975 MILLIGRAM(S): at 14:33

## 2024-10-02 RX ADMIN — Medication 3 MILLILITER(S): at 05:33

## 2024-10-02 NOTE — LACTATION INITIAL EVALUATION - POSITION
[FreeTextEntry1] : Corey was seen today for left foot injury evaluation.  He has no fracture.  He is clear to return to full gym and sport activity at this time without restrictions.\par \par Sincerely,\par \par Eddie Yañez DO, ATC\par Primary Care Sports Medicine\par [facility] [place]\par \par 
cross cradle/football hold

## 2024-10-02 NOTE — LACTATION INITIAL EVALUATION - INTERVENTION OUTCOME
Assisted mom with latch and positioning. Encouraged deeper latch. Instructed in hand expression with good + colostrum noted. Mother and Infant roomed-in.   Mother educated on safe skin to skin care, safe sleep practices and environment. Call bell within reach./verbalizes understanding/demonstrates understanding of teaching

## 2024-10-03 VITALS
SYSTOLIC BLOOD PRESSURE: 107 MMHG | HEART RATE: 91 BPM | DIASTOLIC BLOOD PRESSURE: 66 MMHG | RESPIRATION RATE: 18 BRPM | TEMPERATURE: 98 F | OXYGEN SATURATION: 96 %

## 2024-10-03 RX ORDER — ACETAMINOPHEN 325 MG
3 TABLET ORAL
Qty: 0 | Refills: 0 | DISCHARGE
Start: 2024-10-03

## 2024-10-03 RX ORDER — ASPIRIN 325 MG
1 TABLET ORAL
Refills: 0 | DISCHARGE

## 2024-10-03 RX ADMIN — Medication 600 MILLIGRAM(S): at 13:20

## 2024-10-03 RX ADMIN — Medication 1 TABLET(S): at 12:26

## 2024-10-03 RX ADMIN — Medication 600 MILLIGRAM(S): at 00:43

## 2024-10-03 RX ADMIN — Medication 600 MILLIGRAM(S): at 12:26

## 2024-10-03 RX ADMIN — Medication 975 MILLIGRAM(S): at 09:06

## 2024-10-03 RX ADMIN — Medication 975 MILLIGRAM(S): at 03:32

## 2024-10-03 RX ADMIN — Medication 975 MILLIGRAM(S): at 04:02

## 2024-10-03 RX ADMIN — Medication 975 MILLIGRAM(S): at 10:00

## 2024-10-03 RX ADMIN — Medication 600 MILLIGRAM(S): at 01:13

## 2024-10-03 RX ADMIN — Medication 600 MILLIGRAM(S): at 17:37

## 2024-10-03 RX ADMIN — Medication 975 MILLIGRAM(S): at 14:53

## 2024-10-03 RX ADMIN — Medication 600 MILLIGRAM(S): at 06:22

## 2024-10-03 RX ADMIN — Medication 600 MILLIGRAM(S): at 05:52

## 2024-10-03 RX ADMIN — Medication 975 MILLIGRAM(S): at 15:50

## 2024-10-03 RX ADMIN — Medication 3 MILLILITER(S): at 06:34

## 2024-10-03 RX ADMIN — Medication 600 MILLIGRAM(S): at 18:30

## 2024-10-03 NOTE — PROGRESS NOTE ADULT - ASSESSMENT
A/P: 40yo PPD#2 s/p .  Patient is stable and doing well post-partum.    - Pain well controlled, continue current pain regimen  - Increase ambulation, SCDs when not ambulating  - Continue regular diet  - Discharge planning     Mahogany Vee MD 
A/P: 39y yo P1 PPD#1s/p .  She is in stable condition without complaints.   -Continue routine PP care  -Continue the current pain medication  -Encourage regular diet  -DVT ppx: SCDs only when not ambulating      N Devi-MD Ernie

## 2024-10-03 NOTE — PROGRESS NOTE ADULT - SUBJECTIVE AND OBJECTIVE BOX
OB Attending Postpartum Note    S: Patient is doing well without complaints. Tolerates regular diet. She states lochia is in WNL. Ambulating without difficulty. Denies N/V. Voiding freely. Passing flatus. Pain well controlled with oral pain medications. She is breastfeeding.    O: Vital Signs Last 24 Hrs  T(C): 36.6 (02 Oct 2024 05:36), Max: 37.7 (02 Oct 2024 01:00)  T(F): 97.8 (02 Oct 2024 05:36), Max: 99.9 (02 Oct 2024 01:00)  HR: 74 (02 Oct 2024 05:36) (68 - 149)  BP: 115/73 (02 Oct 2024 05:36) (101/60 - 172/63)  BP(mean): --  RR: 18 (02 Oct 2024 05:36) (16 - 19)  SpO2: 100% (02 Oct 2024 05:36) (78% - 100%)    Parameters below as of 02 Oct 2024 05:36  Patient On (Oxygen Delivery Method): room air        Labs:                        10.7   10.85 )-----------( 208      ( 01 Oct 2024 02:06 )             29.3       Physical Exam:  General: NAD  Abdomen: soft, non-tender, non-distended  Vaginal: Lochia wnl  Extremities: No redness/swelling    acetaminophen     Tablet .. 975 milliGRAM(s) Oral <User Schedule>  benzocaine 20%/menthol 0.5% Spray 1 Spray(s) Topical every 6 hours PRN  carboprost Injectable 250 MICROGram(s) IntraMuscular once  dibucaine 1% Ointment 1 Application(s) Topical every 6 hours PRN  diphenhydrAMINE 25 milliGRAM(s) Oral every 6 hours PRN  diphtheria/tetanus/pertussis (acellular) Vaccine (Adacel) 0.5 milliLiter(s) IntraMuscular once  hydrocortisone 1% Cream 1 Application(s) Topical every 6 hours PRN  ibuprofen  Tablet. 600 milliGRAM(s) Oral every 6 hours  influenza   Vaccine 0.5 milliLiter(s) IntraMuscular once  lanolin Ointment 1 Application(s) Topical every 6 hours PRN  magnesium hydroxide Suspension 30 milliLiter(s) Oral two times a day PRN  oxyCODONE    IR 5 milliGRAM(s) Oral once PRN  oxyCODONE    IR 5 milliGRAM(s) Oral every 3 hours PRN  oxytocin Infusion 167 milliUNIT(s)/Min IV Continuous <Continuous>  pramoxine 1%/zinc 5% Cream 1 Application(s) Topical every 4 hours PRN  prenatal multivitamin 1 Tablet(s) Oral daily  simethicone 80 milliGRAM(s) Chew every 4 hours PRN  sodium chloride 0.9% lock flush 3 milliLiter(s) IV Push every 8 hours  witch hazel Pads 1 Application(s) Topical every 4 hours PRN        
OB Progress Note:  PPD#2    S: 40yo PPD#2 s/p . Patient feels well. Pain is well controlled, tolerating regular diet, passing flatus, voiding spontaneously, ambulating without difficulty. Denies heavy vaginal bleeding, CP/SOB, leadheadedness/dizziness, N/V.    O:  Vitals:   Vital Signs Last 24 Hrs  T(C): 36.8 (03 Oct 2024 06:47), Max: 36.8 (02 Oct 2024 18:00)  T(F): 98.2 (03 Oct 2024 06:47), Max: 98.3 (02 Oct 2024 18:00)  HR: 59 (03 Oct 2024 06:47) (59 - 85)  BP: 101/59 (03 Oct 2024 06:47) (101/59 - 112/67)  BP(mean): --  RR: 18 (03 Oct 2024 06:47) (18 - 18)  SpO2: 100% (03 Oct 2024 06:47) (100% - 100%)    Parameters below as of 03 Oct 2024 06:47  Patient On (Oxygen Delivery Method): room air        MEDICATIONS  (STANDING):  acetaminophen     Tablet .. 975 milliGRAM(s) Oral <User Schedule>  carboprost Injectable 250 MICROGram(s) IntraMuscular once  diphtheria/tetanus/pertussis (acellular) Vaccine (Adacel) 0.5 milliLiter(s) IntraMuscular once  ibuprofen  Tablet. 600 milliGRAM(s) Oral every 6 hours  influenza   Vaccine 0.5 milliLiter(s) IntraMuscular once  prenatal multivitamin 1 Tablet(s) Oral daily  sodium chloride 0.9% lock flush 3 milliLiter(s) IV Push every 8 hours    MEDICATIONS  (PRN):  benzocaine 20%/menthol 0.5% Spray 1 Spray(s) Topical every 6 hours PRN for Perineal discomfort  dibucaine 1% Ointment 1 Application(s) Topical every 6 hours PRN Perineal discomfort  diphenhydrAMINE 25 milliGRAM(s) Oral every 6 hours PRN Pruritus  hydrocortisone 1% Cream 1 Application(s) Topical every 6 hours PRN Moderate Pain (4-6)  lanolin Ointment 1 Application(s) Topical every 6 hours PRN nipple soreness  magnesium hydroxide Suspension 30 milliLiter(s) Oral two times a day PRN Constipation  oxyCODONE    IR 5 milliGRAM(s) Oral every 3 hours PRN Moderate to Severe Pain (4-10)  oxyCODONE    IR 5 milliGRAM(s) Oral once PRN Moderate to Severe Pain (4-10)  pramoxine 1%/zinc 5% Cream 1 Application(s) Topical every 4 hours PRN Moderate Pain (4-6)  simethicone 80 milliGRAM(s) Chew every 4 hours PRN Gas  witch hazel Pads 1 Application(s) Topical every 4 hours PRN Perineal discomfort      Labs:  Blood type: O Positive  Rubella IgG: RPR: Negative                          10.7[L]   10.85[H] >-----------< 208    ( 10-01 @ 02:06 )             29.3[L]                  Physical Exam:  General: NAD  CV: RRR  Resp: CTAB  Abdomen: soft, non-tender, non-distended, fundus firm  : Nl lochia  Extremities: No erythema/edema

## 2024-10-04 ENCOUNTER — APPOINTMENT (OUTPATIENT)
Dept: ANTEPARTUM | Facility: CLINIC | Age: 39
End: 2024-10-04

## 2024-10-04 ENCOUNTER — APPOINTMENT (OUTPATIENT)
Dept: OBGYN | Facility: CLINIC | Age: 39
End: 2024-10-04

## 2024-10-07 ENCOUNTER — APPOINTMENT (OUTPATIENT)
Age: 39
End: 2024-10-07
Payer: COMMERCIAL

## 2024-10-07 ENCOUNTER — TRANSCRIPTION ENCOUNTER (OUTPATIENT)
Age: 39
End: 2024-10-07

## 2024-10-07 PROCEDURE — S9443: CPT | Mod: 95

## 2024-10-24 NOTE — OB PROVIDER H&P - NSGENETICTESTING_OBGYN_ALL_OB
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 This is a sequel of chronic severe stenosis at the junction of the transverse and sigmoid venous sinuses. We discussed the natural history of intracranial venous aneurysms and I explained to the patient that these aneurysms DO NOT cause intracranial hemorrhage or stroke.   The pulsatile tinnitus is severe and has a negative impact in social life, work and daily living. We discussed treatment options which include observation, trial of Diamox, endovascular treatment, open surgery.  The patient would like to proceed with endovascular treatment. Treatment includes stent -assisted embolization of the wide-neck venous aneurysm of the proximal sigmoid venous sinus. We discussed with the patient my extensive personal experience with this treatment and the results of a recent clinical trial (Sony et al, Interventional Neuroradiology 2020). We discussed the procedure that has two components. The first part consists of catheter angiogram and manometry to assess the degree of stenosis and confirm the presence of the venous aneurysm. This part is typically performed with the patient awake. The second part consists of embolization of the venous aneurysm utilizing stent, coils or both and is performed under general anesthesia.  The risks, benefits and alternatives of the procedure were discussed with the patient in detail. In my personal experience, the risks are very rare, but the possibility is not zero. Risks include stroke, brain hemorrhage, any type of disability (facial or extremity weakness, facial or extremity numbness, speech difficulties, blindness) and death. There are also possible complications related to the incisions such as infection, pain, swelling and bleeding.  The patient is aware that the procedure requires dual antiplatelet therapy for 1-month post-stent (typically aspirin 325 mg daily and clopidogrel 75 mg daily) and antiplatelet monotherapy (typically aspirin 325 mg daily) for additional 11 months post-stent.  Of note, she is considering liposuction and revision of abdominal scar.  I advised her that she will not be able to stop her ASA for one year post stenting for elective procedures.  There is no known contraindication from a neurovascular standpoint to proceeding with plastic surgery prior to treating the venous aneurysm.  PLAN Send LP Report Send Dr. Augustin last visit Schedule Embolization of wide-neck venous aneurysm Will Need ASA and Plavix for 5 days prior to the procedure
Yes

## 2024-11-14 ENCOUNTER — APPOINTMENT (OUTPATIENT)
Dept: OBGYN | Facility: CLINIC | Age: 39
End: 2024-11-14

## 2024-11-14 VITALS
DIASTOLIC BLOOD PRESSURE: 85 MMHG | WEIGHT: 199 LBS | HEIGHT: 65 IN | BODY MASS INDEX: 33.15 KG/M2 | SYSTOLIC BLOOD PRESSURE: 120 MMHG

## 2024-11-14 DIAGNOSIS — N89.8 OTHER SPECIFIED NONINFLAMMATORY DISORDERS OF VAGINA: ICD-10-CM

## 2024-11-14 PROCEDURE — 0503F POSTPARTUM CARE VISIT: CPT

## 2024-11-16 LAB
CANDIDA VAG CYTO: NOT DETECTED
G VAGINALIS+PREV SP MTYP VAG QL MICRO: NOT DETECTED
T VAGINALIS VAG QL WET PREP: NOT DETECTED

## 2024-11-21 ENCOUNTER — APPOINTMENT (OUTPATIENT)
Dept: OBGYN | Facility: CLINIC | Age: 39
End: 2024-11-21

## 2025-02-13 ENCOUNTER — APPOINTMENT (OUTPATIENT)
Dept: OBGYN | Facility: CLINIC | Age: 40
End: 2025-02-13

## 2025-02-13 ENCOUNTER — APPOINTMENT (OUTPATIENT)
Dept: ANTEPARTUM | Facility: CLINIC | Age: 40
End: 2025-02-13

## 2025-02-13 VITALS
WEIGHT: 214 LBS | DIASTOLIC BLOOD PRESSURE: 86 MMHG | BODY MASS INDEX: 35.65 KG/M2 | HEIGHT: 65 IN | SYSTOLIC BLOOD PRESSURE: 125 MMHG

## 2025-02-13 DIAGNOSIS — Z01.419 ENCOUNTER FOR GYNECOLOGICAL EXAMINATION (GENERAL) (ROUTINE) W/OUT ABNORMAL FINDINGS: ICD-10-CM

## 2025-02-13 PROCEDURE — 76830 TRANSVAGINAL US NON-OB: CPT

## 2025-02-13 PROCEDURE — 99459 PELVIC EXAMINATION: CPT

## 2025-02-13 PROCEDURE — 76857 US EXAM PELVIC LIMITED: CPT

## 2025-02-13 PROCEDURE — 99396 PREV VISIT EST AGE 40-64: CPT

## 2025-02-15 LAB
C TRACH RRNA SPEC QL NAA+PROBE: NOT DETECTED
HPV HIGH+LOW RISK DNA PNL CVX: NOT DETECTED
N GONORRHOEA RRNA SPEC QL NAA+PROBE: NOT DETECTED
SOURCE AMPLIFICATION: NORMAL

## 2025-02-20 LAB — CYTOLOGY CVX/VAG DOC THIN PREP: ABNORMAL

## 2025-06-05 ENCOUNTER — APPOINTMENT (OUTPATIENT)
Dept: SURGERY | Facility: CLINIC | Age: 40
End: 2025-06-05
Payer: COMMERCIAL

## 2025-06-05 VITALS
HEIGHT: 65 IN | WEIGHT: 206 LBS | SYSTOLIC BLOOD PRESSURE: 124 MMHG | HEART RATE: 80 BPM | BODY MASS INDEX: 34.32 KG/M2 | DIASTOLIC BLOOD PRESSURE: 87 MMHG

## 2025-06-05 DIAGNOSIS — K42.9 UMBILICAL HERNIA W/OUT OBSTRUCTION OR GANGRENE: ICD-10-CM

## 2025-06-05 DIAGNOSIS — M62.08 SEPARATION OF MUSCLE (NONTRAUMATIC), OTHER SITE: ICD-10-CM

## 2025-06-05 DIAGNOSIS — D64.9 ANEMIA, UNSPECIFIED: ICD-10-CM

## 2025-06-05 DIAGNOSIS — K21.9 GASTRO-ESOPHAGEAL REFLUX DISEASE W/OUT ESOPHAGITIS: ICD-10-CM

## 2025-06-05 DIAGNOSIS — K43.9 VENTRAL HERNIA W/OUT OBSTRUCTION OR GANGRENE: ICD-10-CM

## 2025-06-05 PROCEDURE — 99203 OFFICE O/P NEW LOW 30 MIN: CPT

## 2025-06-05 RX ORDER — FUROSEMIDE 80 MG/1
TABLET ORAL
Refills: 0 | Status: ACTIVE | COMMUNITY

## 2025-06-25 NOTE — PRE PROCEDURE NOTE - PRE PROCEDURE EVALUATION
Name:NORM JOHNSONMRN:[id number] Date:2025    3:30PMDOB:[id number] By:NASRIN HAIRSTON TDocumented Status:FinalReason For VisitNORM JOHNSON is a 39 year old female being seen for a consultation visit, ventral hernia, umbilical hernia. History of Present IllnessMs. ELIZABETH is a 39 year y/o F referred by Dr. Mack- for evaluation of abdominal wall hernia. Pt has known diastasis recti, which she states has become more prominent since her last delivery. She feels a bulge to the abdominal wall, which is bothersome. Denies previous abdominal surgery. Active ProblemsUnreviewed Unverified: Acid reflux (530.81) (K21.9)Unreviewed Unverified: Anemia (285.9) (D64.9)10 weeks gestation of pregnancy (V22.2) (Z3A.10)Abdominal wall bulge (789.30) (R19.00)Abnormal fetal ultrasound (796.5) (O28.3)Anemia in pregnancy (648.20,285.9) (O99.019)Breast pain in female (611.71) (N64.4)Encounter for annual routine gynecological examination (V72.31) (Z01.419)Encounter for lactation counseling (V24.1) (Z39.1)Encounter for supervision of normal first pregnancy in second trimester (V22.0) (Z34.02)Encounter for supervision of other normal pregnancy in second trimester (V22.1)(Z34.82)Fetal cardiac anomaly complicating pregnancy, antepartum (655.83) (O35.BXX0)Infertility counseling (V26.49) (Z31.69)Irregular menses (626.4) (N92.6)Missed menses (626.4) (N92.6)Patient desires pregnancy (V26.9) (Z31.9)Spontaneous  (634.90) (O03.9)Vaginal discharge (623.5) (N89.8)Vaginitis (616.10) (N76.0)Ventral hernia (553.20) (K43.9)Yeast infection (112.9) (B37.9)Past Medical HistoryHistory of No significant past medical historySurgical HistoryHistory of Dilation And CurettageFamily HistoryFamily history of Chronic hypertension : Mother, FatherFamily history of malignant neoplasm of breast (V16.3) (Z80.3) : Maternal AuntFamily history of thyroid disease (V18.19) (Z83.49) : MotherFHx: SVT (supraventricular tachycardia) (V17.49) (Z82.49) : Mother          Social HistoryEmployedFormer smoker (V15.82) (Z87.891)No illicit drug useSocial alcohol use (V49.89) (Z78.9)Current MedsACCRUFeR 30 MG Oral Capsule; Take 1 capsule twice dailyAspirin 81 MG Oral Tablet Delayed Release; TAKE 1 TABLET DAILY AS DIRECTEDFerralet 90 90-1 MG Oral Tablet; Take 1 tablet dailyFluconazole 150 MG Oral Tablet; TAKE 1 TABLET EVERY 72 HOURS FOR 3 DOSESFurosemide TABSmetroNIDAZOLE 0.75 % Vaginal Gel; INSERT 1 APPLICATORFUL INTRAVAGINALLY ATBEDTIME NIGHTLYMulti Vitamin Daily TABSProbiotic 250 MG Oral CapsuleReview of SystemsGastrointestinal:. abdominal wall bulge. Constitutional, Eyes, ENT, Cardiovascular, Respiratory, Genitourinary, Musculoskeletal, Integumentary, Neurological, Psychiatric, Endocrine and Heme/Lymph are otherwise negative. VitalsVital Signs Recorded: :22KKIjhywqst516Bnqcwwcpa25Pbnwtf9 ft 5 npOmqxnb171 lb BMI Bvdqnogklb80.28 kg/m2BSA Calculated2 q6Pwpzl Pzbv18Hszomxbq ExamGeneral Appearance: A/Ox3; NAD. appears comfortable. Respiratory: airway patent, no use of accessory muscles. Gastrointestinal:. abd soft NTNDdiastasis recti umbilical herniaventral hernia.           Skin:. no rash or lesion. Neurologic: alert,oriented to person,oriented to placeandoriented to time. Psychiatric: calm. AssessmentDiastasis recti (728.84) (M62.08)Umbilical hernia (553.1) (K42.9)Ventral hernia (553.20) (K43.9)IMP: 38 yo F with diastasis, found to have ventral and umbilical hernias. PlanUmbilical hernia, Ventral hernia Surgery Booking Form Outpatient    .    Status: Need Information - Required information Requested for: [id number] the patient receive COVID 19 vaccine? : NoERP (Enhanced Recovery Program) : NoPacer : NDiabetic : NMedical Evaluation : NoPST Triage Evaluation : Surgeon H&PAdmission Type : OPAnesthesia Alert : NAnesthesia Type : GeneralLaterality : N/ALength of Procedure : 1.5 hourProcedure Description: : robotic assisted repair of umbilical and ventral hernias, with        mesh.Ms. NORM JOHNSON was informed of significance of findings. plan for robotic assisted repair of umbilical and ventral hernias, with mesh. Risks of the procedure were discussed w the patent in depth. These include but are not limited to, bleeding, infection (including mesh related), injury to surrounding structures, pain, seroma or hematoma formation, risk of hernia recurrence and complications related to anesthesia.           The patient wishes to proceed with surgery. We will plan for surgery on at the next available date, pending any required insurance pre-certification or pre-approval. She agrees to obtain any necessary pre-operative evaluationsand testing prior to surgery.Patient advised to seek immediate medical attention with any acute change in symptoms or with the development of any new or worsening symptoms. Patient's questions and concerns addressed to patient's satisfaction, and patient verbalized an understanding of the information discussed.Will schedule for the repair at [facility].  Electronically signed by : NASRIN HAIRSTON MD; 2025    6:25PM Eastern Standard Time (Author)

## 2025-06-27 ENCOUNTER — APPOINTMENT (OUTPATIENT)
Dept: SURGERY | Facility: HOSPITAL | Age: 40
End: 2025-06-27

## 2025-06-27 ENCOUNTER — TRANSCRIPTION ENCOUNTER (OUTPATIENT)
Age: 40
End: 2025-06-27

## 2025-06-27 ENCOUNTER — OUTPATIENT (OUTPATIENT)
Dept: OUTPATIENT SERVICES | Facility: HOSPITAL | Age: 40
LOS: 1 days | End: 2025-06-27
Payer: COMMERCIAL

## 2025-06-27 VITALS
SYSTOLIC BLOOD PRESSURE: 143 MMHG | TEMPERATURE: 98 F | DIASTOLIC BLOOD PRESSURE: 88 MMHG | HEART RATE: 72 BPM | OXYGEN SATURATION: 96 % | RESPIRATION RATE: 16 BRPM

## 2025-06-27 VITALS
HEIGHT: 65 IN | DIASTOLIC BLOOD PRESSURE: 84 MMHG | WEIGHT: 205.91 LBS | SYSTOLIC BLOOD PRESSURE: 125 MMHG | OXYGEN SATURATION: 100 % | HEART RATE: 75 BPM | RESPIRATION RATE: 16 BRPM | TEMPERATURE: 98 F

## 2025-06-27 DIAGNOSIS — K42.9 UMBILICAL HERNIA WITHOUT OBSTRUCTION OR GANGRENE: ICD-10-CM

## 2025-06-27 DIAGNOSIS — K43.9 VENTRAL HERNIA WITHOUT OBSTRUCTION OR GANGRENE: ICD-10-CM

## 2025-06-27 LAB — HCG UR QL: NEGATIVE — SIGNIFICANT CHANGE UP

## 2025-06-27 PROCEDURE — S2900 ROBOTIC SURGICAL SYSTEM: CPT

## 2025-06-27 PROCEDURE — 49593 RPR AA HRN 1ST 3-10 RDC: CPT

## 2025-06-27 PROCEDURE — 81025 URINE PREGNANCY TEST: CPT

## 2025-06-27 PROCEDURE — S2900: CPT

## 2025-06-27 PROCEDURE — C1781: CPT

## 2025-06-27 DEVICE — MESH VENTRALIGHT ST ECHO 4X6IN: Type: IMPLANTABLE DEVICE | Status: FUNCTIONAL

## 2025-06-27 RX ORDER — OXYCODONE HYDROCHLORIDE AND ACETAMINOPHEN 10; 325 MG/1; MG/1
1 TABLET ORAL
Qty: 10 | Refills: 0
Start: 2025-06-27 | End: 2025-06-29

## 2025-06-27 RX ORDER — HYDROMORPHONE/SOD CHLOR,ISO/PF 2 MG/10 ML
0.5 SYRINGE (ML) INJECTION
Refills: 0 | Status: DISCONTINUED | OUTPATIENT
Start: 2025-06-27 | End: 2025-06-27

## 2025-06-27 RX ORDER — OXYCODONE HYDROCHLORIDE AND ACETAMINOPHEN 10; 325 MG/1; MG/1
1 TABLET ORAL ONCE
Refills: 0 | Status: DISCONTINUED | OUTPATIENT
Start: 2025-06-27 | End: 2025-06-27

## 2025-06-27 RX ORDER — FENTANYL CITRATE-0.9 % NACL/PF 100MCG/2ML
25 SYRINGE (ML) INTRAVENOUS
Refills: 0 | Status: DISCONTINUED | OUTPATIENT
Start: 2025-06-27 | End: 2025-06-27

## 2025-06-27 RX ORDER — FUROSEMIDE 10 MG/ML
1 INJECTION INTRAMUSCULAR; INTRAVENOUS
Refills: 0 | DISCHARGE

## 2025-06-27 RX ORDER — OXYCODONE HYDROCHLORIDE 30 MG/1
5 TABLET ORAL ONCE
Refills: 0 | Status: ACTIVE | OUTPATIENT
Start: 2025-06-27

## 2025-06-27 RX ADMIN — Medication 25 MICROGRAM(S): at 10:13

## 2025-06-27 RX ADMIN — Medication 1 APPLICATION(S): at 06:36

## 2025-06-27 RX ADMIN — Medication 25 MICROGRAM(S): at 09:58

## 2025-06-27 NOTE — BRIEF OPERATIVE NOTE - NSICDXBRIEFPREOP_GEN_ALL_CORE_FT
PRE-OP DIAGNOSIS:  Incisional hernia 27-Jun-2025 09:43:41  Chaparro Winkler  Umbilical hernia 27-Jun-2025 09:43:47  Chaparro Winkler

## 2025-06-27 NOTE — BRIEF OPERATIVE NOTE - NSICDXBRIEFPROCEDURE_GEN_ALL_CORE_FT
PROCEDURES:  Robot-assisted repair of nonreducible incisional or ventral hernia using da Edd Xi, defect 3 cm to 10 cm in length 27-Jun-2025 09:43:31  Chaparro Winkler

## 2025-06-27 NOTE — BRIEF OPERATIVE NOTE - NSICDXBRIEFPOSTOP_GEN_ALL_CORE_FT
POST-OP DIAGNOSIS:  H/O umbilical hernia repair 27-Jun-2025 09:44:00  Chaparro Winkler  History of incisional hernia repair 27-Jun-2025 09:44:09  Chaparro Winkler

## 2025-06-27 NOTE — ASU DISCHARGE PLAN (ADULT/PEDIATRIC) - FINANCIAL ASSISTANCE
[facility] provides services at a reduced cost to those who are determined to be eligible through [facility]’s financial assistance program. Information regarding [facility]’s financial assistance program can be found by going to [email or web address] or by calling [phone].

## 2025-06-27 NOTE — ASU DISCHARGE PLAN (ADULT/PEDIATRIC) - CARE PROVIDER_API CALL
Barron Nicolas.  Surgery (General Surgery)  [address], [address]  [place] [address]  Phone: [phone]  Fax: [phone]  Established Patient  Follow Up Time: 2 weeks

## 2025-06-27 NOTE — BRIEF OPERATIVE NOTE - OPERATION/FINDINGS
umbilican and above incisional hernia with incarcerated fat s/p primary repair and intraperitoneal mesh placement.

## 2025-06-27 NOTE — ASU PREOP CHECKLIST - BOWEL PREP
Patient Clipped and Prepped: groin, right radial and right neck. Prepped with ChloraPrep, a minimum of 3 minute dry time, longer if needed, no pooling noted, patient draped in sterile fashion. n/a

## 2025-06-27 NOTE — ASU DISCHARGE PLAN (ADULT/PEDIATRIC) - ASU DC SPECIAL INSTRUCTIONSFT
Detail Level: Simple You had your umbilical and ventral hernia repair    Wound care:   - You can shower: let the soap and water run down over your incisions and pat dry. Do not submerge in water for 2 weeks   Remove dressing in 2 days  - The small pieces of tape over your incisions (steri-strips) will fall off on their own    Activity:   - Do not lift anything heavier than 10-15 lbs for 4 weeks to help prevent hernia formation at your incisions  - You should walk around as much as possible to help speed your recovery and prevent blood clots     Pain control:  - For pain,  tylenol 650 mg every 6 hours for baseline pain control. Do not take more than 4,000 mg of tylenol in 24 hours. For pain not controlled by these medications, take the prescription sent to your pharmacy. The medication sent to your pharmacy contains 325 mg of tylenol, so do not take more than the above listed dosages of tylenol to avoid overdosing. If you take the medication we sent to the pharmacy, we recommend ensuring you drink a lot of water, and take a fiber supplement to help avoid constipation.     Diet:   - You can eat a regular diet    -Wear abdominal binder until seen in clinic

## 2025-07-14 ENCOUNTER — APPOINTMENT (OUTPATIENT)
Dept: SURGERY | Facility: CLINIC | Age: 40
End: 2025-07-14
Payer: COMMERCIAL

## 2025-07-14 PROCEDURE — 99212 OFFICE O/P EST SF 10 MIN: CPT
